# Patient Record
Sex: FEMALE | Race: BLACK OR AFRICAN AMERICAN | NOT HISPANIC OR LATINO | Employment: OTHER | URBAN - METROPOLITAN AREA
[De-identification: names, ages, dates, MRNs, and addresses within clinical notes are randomized per-mention and may not be internally consistent; named-entity substitution may affect disease eponyms.]

---

## 2023-03-24 ENCOUNTER — HOSPITAL ENCOUNTER (OUTPATIENT)
Facility: HOSPITAL | Age: 78
Discharge: HOME HEALTH | End: 2023-03-27
Attending: FAMILY MEDICINE | Admitting: INTERNAL MEDICINE
Payer: MEDICARE

## 2023-03-24 DIAGNOSIS — R55 SYNCOPE: ICD-10-CM

## 2023-03-24 DIAGNOSIS — K52.9 GASTROENTERITIS: ICD-10-CM

## 2023-03-24 DIAGNOSIS — R55 SYNCOPE, UNSPECIFIED SYNCOPE TYPE: Primary | ICD-10-CM

## 2023-03-24 DIAGNOSIS — R11.10 VOMITING: ICD-10-CM

## 2023-03-24 LAB
ALBUMIN SERPL BCP-MCNC: 3.7 G/DL (ref 3.5–5.2)
ALP SERPL-CCNC: 102 U/L (ref 55–135)
ALT SERPL W/O P-5'-P-CCNC: 14 U/L (ref 10–44)
ANION GAP SERPL CALC-SCNC: 16 MMOL/L (ref 8–16)
AST SERPL-CCNC: 19 U/L (ref 10–40)
BACTERIA #/AREA URNS HPF: ABNORMAL /HPF
BASOPHILS # BLD AUTO: 0.04 K/UL (ref 0–0.2)
BASOPHILS NFR BLD: 0.4 % (ref 0–1.9)
BILIRUB SERPL-MCNC: 0.5 MG/DL (ref 0.1–1)
BILIRUB UR QL STRIP: NEGATIVE
BUN SERPL-MCNC: 16 MG/DL (ref 8–23)
CALCIUM SERPL-MCNC: 9.6 MG/DL (ref 8.7–10.5)
CHLORIDE SERPL-SCNC: 105 MMOL/L (ref 95–110)
CLARITY UR: CLEAR
CO2 SERPL-SCNC: 21 MMOL/L (ref 23–29)
COLOR UR: COLORLESS
CREAT SERPL-MCNC: 1.4 MG/DL (ref 0.5–1.4)
DIFFERENTIAL METHOD: ABNORMAL
EOSINOPHIL # BLD AUTO: 0.1 K/UL (ref 0–0.5)
EOSINOPHIL NFR BLD: 0.7 % (ref 0–8)
ERYTHROCYTE [DISTWIDTH] IN BLOOD BY AUTOMATED COUNT: 14.1 % (ref 11.5–14.5)
EST. GFR  (NO RACE VARIABLE): 39 ML/MIN/1.73 M^2
GLUCOSE SERPL-MCNC: 118 MG/DL (ref 70–110)
GLUCOSE UR QL STRIP: NEGATIVE
HCT VFR BLD AUTO: 34.2 % (ref 37–48.5)
HGB BLD-MCNC: 10.7 G/DL (ref 12–16)
HGB UR QL STRIP: NEGATIVE
HYALINE CASTS #/AREA URNS LPF: 7 /LPF
IMM GRANULOCYTES # BLD AUTO: 0.04 K/UL (ref 0–0.04)
IMM GRANULOCYTES NFR BLD AUTO: 0.4 % (ref 0–0.5)
KETONES UR QL STRIP: ABNORMAL
LACTATE SERPL-SCNC: 1.7 MMOL/L (ref 0.5–2.2)
LACTATE SERPL-SCNC: 4.1 MMOL/L (ref 0.5–2.2)
LEUKOCYTE ESTERASE UR QL STRIP: ABNORMAL
LYMPHOCYTES # BLD AUTO: 3.3 K/UL (ref 1–4.8)
LYMPHOCYTES NFR BLD: 34.3 % (ref 18–48)
MCH RBC QN AUTO: 26.1 PG (ref 27–31)
MCHC RBC AUTO-ENTMCNC: 31.3 G/DL (ref 32–36)
MCV RBC AUTO: 83 FL (ref 82–98)
MICROSCOPIC COMMENT: ABNORMAL
MONOCYTES # BLD AUTO: 1.1 K/UL (ref 0.3–1)
MONOCYTES NFR BLD: 11.1 % (ref 4–15)
NEUTROPHILS # BLD AUTO: 5.1 K/UL (ref 1.8–7.7)
NEUTROPHILS NFR BLD: 53.1 % (ref 38–73)
NITRITE UR QL STRIP: NEGATIVE
NRBC BLD-RTO: 0 /100 WBC
PH UR STRIP: 7 [PH] (ref 5–8)
PLATELET # BLD AUTO: 339 K/UL (ref 150–450)
PMV BLD AUTO: 10.3 FL (ref 9.2–12.9)
POTASSIUM SERPL-SCNC: 4.1 MMOL/L (ref 3.5–5.1)
PROT SERPL-MCNC: 7.4 G/DL (ref 6–8.4)
PROT UR QL STRIP: NEGATIVE
RBC # BLD AUTO: 4.1 M/UL (ref 4–5.4)
SODIUM SERPL-SCNC: 142 MMOL/L (ref 136–145)
SP GR UR STRIP: 1.01 (ref 1–1.03)
URN SPEC COLLECT METH UR: ABNORMAL
UROBILINOGEN UR STRIP-ACNC: NEGATIVE EU/DL
WBC # BLD AUTO: 9.59 K/UL (ref 3.9–12.7)
WBC #/AREA URNS HPF: 1 /HPF (ref 0–5)

## 2023-03-24 PROCEDURE — 83605 ASSAY OF LACTIC ACID: CPT | Performed by: FAMILY MEDICINE

## 2023-03-24 PROCEDURE — 96367 TX/PROPH/DG ADDL SEQ IV INF: CPT

## 2023-03-24 PROCEDURE — 93010 EKG 12-LEAD: ICD-10-PCS | Mod: ,,, | Performed by: INTERNAL MEDICINE

## 2023-03-24 PROCEDURE — 93005 ELECTROCARDIOGRAM TRACING: CPT

## 2023-03-24 PROCEDURE — 96375 TX/PRO/DX INJ NEW DRUG ADDON: CPT

## 2023-03-24 PROCEDURE — 96365 THER/PROPH/DIAG IV INF INIT: CPT

## 2023-03-24 PROCEDURE — 93010 ELECTROCARDIOGRAM REPORT: CPT | Mod: ,,, | Performed by: INTERNAL MEDICINE

## 2023-03-24 PROCEDURE — 63600175 PHARM REV CODE 636 W HCPCS: Performed by: FAMILY MEDICINE

## 2023-03-24 PROCEDURE — 87040 BLOOD CULTURE FOR BACTERIA: CPT | Mod: 59 | Performed by: FAMILY MEDICINE

## 2023-03-24 PROCEDURE — 85025 COMPLETE CBC W/AUTO DIFF WBC: CPT | Performed by: FAMILY MEDICINE

## 2023-03-24 PROCEDURE — 80053 COMPREHEN METABOLIC PANEL: CPT | Performed by: FAMILY MEDICINE

## 2023-03-24 PROCEDURE — 25000003 PHARM REV CODE 250: Performed by: FAMILY MEDICINE

## 2023-03-24 PROCEDURE — 96361 HYDRATE IV INFUSION ADD-ON: CPT

## 2023-03-24 PROCEDURE — 99285 EMERGENCY DEPT VISIT HI MDM: CPT | Mod: 25

## 2023-03-24 PROCEDURE — 81000 URINALYSIS NONAUTO W/SCOPE: CPT | Performed by: FAMILY MEDICINE

## 2023-03-24 RX ORDER — MORPHINE SULFATE 4 MG/ML
4 INJECTION, SOLUTION INTRAMUSCULAR; INTRAVENOUS
Status: COMPLETED | OUTPATIENT
Start: 2023-03-24 | End: 2023-03-24

## 2023-03-24 RX ORDER — ONDANSETRON 2 MG/ML
8 INJECTION INTRAMUSCULAR; INTRAVENOUS
Status: COMPLETED | OUTPATIENT
Start: 2023-03-24 | End: 2023-03-24

## 2023-03-24 RX ORDER — SODIUM CHLORIDE 9 MG/ML
1000 INJECTION, SOLUTION INTRAVENOUS
Status: COMPLETED | OUTPATIENT
Start: 2023-03-24 | End: 2023-03-24

## 2023-03-24 RX ADMIN — ONDANSETRON 8 MG: 2 INJECTION INTRAMUSCULAR; INTRAVENOUS at 04:03

## 2023-03-24 RX ADMIN — MORPHINE SULFATE 4 MG: 4 INJECTION INTRAVENOUS at 05:03

## 2023-03-24 RX ADMIN — PROMETHAZINE HYDROCHLORIDE 25 MG: 25 INJECTION INTRAMUSCULAR; INTRAVENOUS at 09:03

## 2023-03-24 RX ADMIN — PIPERACILLIN SODIUM AND TAZOBACTAM SODIUM 4.5 G: 4; .5 INJECTION, POWDER, FOR SOLUTION INTRAVENOUS at 06:03

## 2023-03-24 RX ADMIN — SODIUM CHLORIDE 1000 ML: 9 INJECTION, SOLUTION INTRAVENOUS at 06:03

## 2023-03-24 RX ADMIN — SODIUM CHLORIDE 1000 ML: 9 INJECTION, SOLUTION INTRAVENOUS at 04:03

## 2023-03-24 RX ADMIN — SODIUM CHLORIDE 1000 ML: 9 INJECTION, SOLUTION INTRAVENOUS at 09:03

## 2023-03-24 NOTE — ED PROVIDER NOTES
SCRIBE #1 NOTE: I, Placido Patterson and Nic Toure, am scribing for, and in the presence of, Rachel Piña MD. I have scribed the entire note.       History     Chief Complaint   Patient presents with    Emesis     Vomiting, diarrhea after eating seafood.  Pt had syncopal episode     Review of patient's allergies indicates:   Allergen Reactions    Penicillins          History of Present Illness     HPI    3/24/2023, 5:02 PM  History obtained from the patient and daughter      History of Present Illness: Patti Bragg is a 77 y.o. female patient who presents to the Emergency Department for evaluation of Emesis which onset PTA. Pt was eating fried seafood and started to feel unwell and passed out while shopping. Symptoms are constant and moderate in severity. No mitigating or exacerbating factors reported. Associated sxs include n/d, stomach pain, and weakness. Pt also passwed out after eating and Patient denies any SOB, fever, CP, HA and all other sxs at this time.No prior Tx. No further complaints or concerns at this time.       Arrival mode: EMS     PCP: Janis Lubin MD        Past Medical History:  Past Medical History:   Diagnosis Date    Diabetes mellitus     Hypertension     Mixed hyperlipidemia        Past Surgical History:  History reviewed. No pertinent surgical history.      Family History:  History reviewed. No pertinent family history.    Social History:  Social History     Tobacco Use    Smoking status: Unknown    Smokeless tobacco: Not on file   Substance and Sexual Activity    Alcohol use: Not on file    Drug use: Not on file    Sexual activity: Not on file        Review of Systems     Review of Systems   Constitutional:  Negative for chills and fever.   HENT:  Negative for congestion and sore throat.    Respiratory:  Negative for cough and shortness of breath.    Cardiovascular:  Negative for chest pain.   Gastrointestinal:  Positive for abdominal pain, diarrhea, nausea and vomiting.    Genitourinary:  Negative for dysuria.   Musculoskeletal:  Negative for back pain.   Skin:  Negative for rash.   Neurological:  Positive for weakness. Negative for dizziness and headaches.   Hematological:  Does not bruise/bleed easily.   All other systems reviewed and are negative.     Physical Exam     Initial Vitals   BP Pulse Resp Temp SpO2   03/24/23 1640 03/24/23 1640 03/24/23 1640 03/24/23 1749 03/24/23 1640   126/64 82 18 97.6 °F (36.4 °C) 100 %      MAP       --                 Physical Exam  Nursing Notes and Vital Signs Reviewed.  Constitutional: Patient is in acute distress. Well-developed and well-nourished.  Head: Atraumatic. Normocephalic.  Eyes: PERRL. EOM intact. Conjunctivae are not pale. No scleral icterus.  ENT: Mucous membranes are moist. Oropharynx is clear and symmetric.    Neck: Supple. Full ROM. No lymphadenopathy.  Cardiovascular: Regular rate. Regular rhythm. No murmurs, rubs, or gallops. Distal pulses are 2+ and symmetric.  Pulmonary/Chest: No respiratory distress. Clear to auscultation bilaterally. No wheezing or rales.  Abdominal: Soft and non-distended.  No rebound, guarding, or rigidity. Good bowel sounds. Bilateral LQ tenderness. Vomiting.  Genitourinary: No CVA tenderness  Musculoskeletal: Moves all extremities. No obvious deformities. No edema. No calf tenderness.  Skin: Warm and dry.  Neurological:  Alert, awake, and appropriate.  Normal speech.  No acute focal neurological deficits are appreciated.  Psychiatric: Normal affect. Good eye contact. Appropriate in content.     ED Course   Critical Care    Date/Time: 3/24/2023 5:48 PM  Performed by: Rachel Piña MD  Authorized by: Rachel Piña MD   Direct patient critical care time: 9 minutes  Additional history critical care time: 7 minutes  Ordering / reviewing critical care time: 6 minutes  Documentation critical care time: 7 minutes  Consulting other physicians critical care time: 7 minutes  Total critical care time  "(exclusive of procedural time) : 36 minutes  Critical care time was exclusive of separately billable procedures and treating other patients and teaching time.  Critical care was necessary to treat or prevent imminent or life-threatening deterioration of the following conditions: sepsis (Syncope).  Critical care was time spent personally by me on the following activities: blood draw for specimens, development of treatment plan with patient or surrogate, discussions with consultants, interpretation of cardiac output measurements, evaluation of patient's response to treatment, examination of patient, obtaining history from patient or surrogate, pulse oximetry, ordering and review of laboratory studies, ordering and performing treatments and interventions, re-evaluation of patient's condition and review of old charts.      ED Vital Signs:  Vitals:    03/24/23 2030 03/24/23 2342 03/24/23 2345 03/25/23 0105   BP: 136/76 136/76  128/62   Pulse: (!) 55 69 69 80   Resp: 18 16     Temp:  98 °F (36.7 °C)     TempSrc:  Oral     SpO2: 99% (!) 92% 95%    Weight:       Height:        03/25/23 0110 03/25/23 0114 03/25/23 0115 03/25/23 0330   BP: 130/66 134/71 134/71 133/77   Pulse: 69 (!) 58 62 64   Resp:   17 14   Temp:   98 °F (36.7 °C) 98.3 °F (36.8 °C)   TempSrc:   Oral Oral   SpO2:   97% (!) 94%   Weight:       Height:        03/25/23 0640 03/25/23 0839 03/25/23 1020 03/25/23 1028   BP: (!) 143/70 127/67 123/65    Pulse: 60 (!) 53 (!) 56    Resp: 14 18 18    Temp: 98 °F (36.7 °C) 97.3 °F (36.3 °C) 98.4 °F (36.9 °C)    TempSrc: Oral Oral Oral    SpO2: (!) 94% 98% 97%    Weight:    90.1 kg (198 lb 10.2 oz)   Height:    5' 5.5" (1.664 m)    03/25/23 1215 03/25/23 1444 03/25/23 1557   BP: 135/63  (!) 109/53   Pulse: (!) 50  62   Resp: 18  18   Temp: 98.4 °F (36.9 °C)  98.6 °F (37 °C)   TempSrc: Oral  Oral   SpO2: 98% 99% 95%   Weight: 89.8 kg (198 lb)     Height: 5' 5" (1.651 m)         Abnormal Lab Results:  Labs Reviewed   CBC " W/ AUTO DIFFERENTIAL - Abnormal; Notable for the following components:       Result Value    Hemoglobin 10.7 (*)     Hematocrit 34.2 (*)     MCH 26.1 (*)     MCHC 31.3 (*)     Mono # 1.1 (*)     All other components within normal limits   COMPREHENSIVE METABOLIC PANEL - Abnormal; Notable for the following components:    CO2 21 (*)     Glucose 118 (*)     eGFR 39 (*)     All other components within normal limits   LACTIC ACID, PLASMA - Abnormal; Notable for the following components:    Lactate (Lactic Acid) 4.1 (*)     All other components within normal limits    Narrative:     LA critical result(s) called and verbal readback obtained from SAMM LOO RN by [email or web address] 03/24/2023 17:39   URINALYSIS, REFLEX TO URINE CULTURE - Abnormal; Notable for the following components:    Color, UA Colorless (*)     Ketones, UA Trace (*)     Leukocytes, UA Trace (*)     All other components within normal limits    Narrative:     Specimen Source->Urine   URINALYSIS MICROSCOPIC - Abnormal; Notable for the following components:    Hyaline Casts, UA 7 (*)     All other components within normal limits    Narrative:     Specimen Source->Urine   CULTURE, BLOOD    Narrative:     Aerobic and anaerobic   CULTURE, BLOOD    Narrative:     Aerobic and anaerobic   LACTIC ACID, PLASMA   B-TYPE NATRIURETIC PEPTIDE   TROPONIN I   TROPONIN I   TROPONIN I        All Lab Results:  Results for orders placed or performed during the hospital encounter of 03/24/23   Blood culture x two cultures. Draw prior to antibiotics.    Specimen: Peripheral, Antecubital, Right; Blood   Result Value Ref Range    Blood Culture, Routine No Growth to date    Blood culture x two cultures. Draw prior to antibiotics.    Specimen: Peripheral, Antecubital, Left; Blood   Result Value Ref Range    Blood Culture, Routine No Growth to date    CBC auto differential   Result Value Ref Range    WBC 9.59 3.90 - 12.70 K/uL    RBC 4.10 4.00 - 5.40 M/uL    Hemoglobin 10.7 (L) 12.0 - 16.0  g/dL    Hematocrit 34.2 (L) 37.0 - 48.5 %    MCV 83 82 - 98 fL    MCH 26.1 (L) 27.0 - 31.0 pg    MCHC 31.3 (L) 32.0 - 36.0 g/dL    RDW 14.1 11.5 - 14.5 %    Platelets 339 150 - 450 K/uL    MPV 10.3 9.2 - 12.9 fL    Immature Granulocytes 0.4 0.0 - 0.5 %    Gran # (ANC) 5.1 1.8 - 7.7 K/uL    Immature Grans (Abs) 0.04 0.00 - 0.04 K/uL    Lymph # 3.3 1.0 - 4.8 K/uL    Mono # 1.1 (H) 0.3 - 1.0 K/uL    Eos # 0.1 0.0 - 0.5 K/uL    Baso # 0.04 0.00 - 0.20 K/uL    nRBC 0 0 /100 WBC    Gran % 53.1 38.0 - 73.0 %    Lymph % 34.3 18.0 - 48.0 %    Mono % 11.1 4.0 - 15.0 %    Eosinophil % 0.7 0.0 - 8.0 %    Basophil % 0.4 0.0 - 1.9 %    Differential Method Automated    Comprehensive metabolic panel   Result Value Ref Range    Sodium 142 136 - 145 mmol/L    Potassium 4.1 3.5 - 5.1 mmol/L    Chloride 105 95 - 110 mmol/L    CO2 21 (L) 23 - 29 mmol/L    Glucose 118 (H) 70 - 110 mg/dL    BUN 16 8 - 23 mg/dL    Creatinine 1.4 0.5 - 1.4 mg/dL    Calcium 9.6 8.7 - 10.5 mg/dL    Total Protein 7.4 6.0 - 8.4 g/dL    Albumin 3.7 3.5 - 5.2 g/dL    Total Bilirubin 0.5 0.1 - 1.0 mg/dL    Alkaline Phosphatase 102 55 - 135 U/L    AST 19 10 - 40 U/L    ALT 14 10 - 44 U/L    Anion Gap 16 8 - 16 mmol/L    eGFR 39 (A) >60 mL/min/1.73 m^2   Lactic acid, plasma #1   Result Value Ref Range    Lactate (Lactic Acid) 4.1 (HH) 0.5 - 2.2 mmol/L   Lactic acid, plasma #2   Result Value Ref Range    Lactate (Lactic Acid) 1.7 0.5 - 2.2 mmol/L   Urinalysis, Reflex to Urine Culture Urine, Clean Catch    Specimen: Urine   Result Value Ref Range    Specimen UA Urine, Clean Catch     Color, UA Colorless (A) Yellow, Straw, Shana    Appearance, UA Clear Clear    pH, UA 7.0 5.0 - 8.0    Specific Gravity, UA 1.010 1.005 - 1.030    Protein, UA Negative Negative    Glucose, UA Negative Negative    Ketones, UA Trace (A) Negative    Bilirubin (UA) Negative Negative    Occult Blood UA Negative Negative    Nitrite, UA Negative Negative    Urobilinogen, UA Negative <2.0 EU/dL     Leukocytes, UA Trace (A) Negative   Urinalysis Microscopic   Result Value Ref Range    WBC, UA 1 0 - 5 /hpf    Bacteria Rare None-Occ /hpf    Hyaline Casts, UA 7 (A) 0-1/lpf /lpf    Microscopic Comment SEE COMMENT    Troponin I   Result Value Ref Range    Troponin I <0.006 0.000 - 0.026 ng/mL   Troponin I   Result Value Ref Range    Troponin I <0.006 0.000 - 0.026 ng/mL   Echo   Result Value Ref Range    BSA 2.03 m2    TDI SEPTAL 0.10 m/s    LV LATERAL E/E' RATIO 8.22 m/s    LV SEPTAL E/E' RATIO 7.40 m/s    LA WIDTH 3.50 cm    IVC diameter 1.66 cm    Left Ventricular Outflow Tract Mean Velocity 1.05 cm/s    Left Ventricular Outflow Tract Mean Gradient 4.90 mmHg    TDI LATERAL 0.09 m/s    LVIDd 3.53 3.5 - 6.0 cm    IVS 1.36 (A) 0.6 - 1.1 cm    Posterior Wall 1.39 (A) 0.6 - 1.1 cm    Ao root annulus 3.28 cm    LVIDs 2.27 2.1 - 4.0 cm    FS 36 28 - 44 %    LA volume 56.78 cm3    STJ 3.50 cm    Ascending aorta 3.56 cm    LV mass 170.07 g    LA size 2.99 cm    TAPSE 1.80 cm    Left Ventricle Relative Wall Thickness 0.79 cm    AV mean gradient 12 mmHg    AV valve area 2.30 cm2    AV Velocity Ratio 0.65     AV index (prosthetic) 0.67     MV valve area p 1/2 method 2.28 cm2    E/A ratio 0.67     Mean e' 0.10 m/s    E wave deceleration time 333.30 msec    IVRT 87.54 msec    LVOT diameter 2.09 cm    LVOT area 3.4 cm2    LVOT peak león 1.46 m/s    LVOT peak VTI 31.70 cm    Ao peak león 2.25 m/s    Ao VTI 47.3 cm    RVOT peak león 0.65 m/s    RVOT peak VTI 16.0 cm    LVOT stroke volume 108.70 cm3    AV peak gradient 20 mmHg    PV mean gradient 0.81 mmHg    E/E' ratio 7.79 m/s    MV Peak E León 0.74 m/s    TR Max León 3.51 m/s    MV stenosis pressure 1/2 time 96.66 ms    MV Peak A Le[place] 1.10 m/s    LV Systolic Volume 17.44 mL    LV Systolic Volume Index 8.9 mL/m2    LV Diastolic Volume 51.94 mL    LV Diastolic Volume Index 26.37 mL/m2    LA Volume Index 28.8 mL/m2    LV Mass Index 86 g/m2    RA Major Axis 4.88 cm    Left Atrium Minor  Axis 6.49 cm    Left Atrium Major Axis 6.28 cm    Triscuspid Valve Regurgitation Peak Gradient 49 mmHg    Right Atrial Pressure (from IVC) 3 mmHg    EF 60 %    TV rest pulmonary artery pressure 52 mmHg   POCT glucose   Result Value Ref Range    POCT Glucose 135 (H) 70 - 110 mg/dL        Imaging Results:  Imaging Results              CT Head Without Contrast (Final result)  Result time 03/25/23 07:04:35      Final result by Jenaro Alves MD (03/25/23 07:04:35)                   Impression:      No evidence of acute intracranial pathology.  Consider further evaluation as warranted.  Chronic findings as above.      Electronically signed by: Jenaro Alves  Date:    03/25/2023  Time:    07:04               Narrative:    EXAMINATION:  CT HEAD WITHOUT CONTRAST    CLINICAL HISTORY:  Mental status change, unknown cause;    TECHNIQUE:  Low dose axial CT images obtained throughout the head without the use of intravenous contrast.  Axial, sagittal and coronal reconstructions were performed. All CT scans at this location are performed using dose modulation techniques as appropriate to a performed exam including the following: Automated exposure control; adjustment of the mA and/or kV according to patient size (this includes techniques or standardized protocols for targeted exams where dose is matched to indication/reason for exam; i. e. extremities or head); use of iterative reconstruction technique.    COMPARISON:  None.    FINDINGS:  Intracranial compartment:    Mild generalized cerebral volume loss and periventricular hypoattenuation suggesting mild chronic microvascular ischemic changes.  No parenchymal mass, hemorrhage, edema or major vascular distribution infarct. No evidence of hydrocephalus.    No extra-axial blood or fluid collections.    Skull/extracranial contents (limited evaluation):    No fracture. Mastoid air cells and paranasal sinuses are essentially clear.                                       X-Ray Chest  AP Portable (Final result)  Result time 03/24/23 18:52:27      Final result by Harry Chamorro MD (03/24/23 18:52:27)                   Impression:      No acute abnormality.      Electronically signed by: Harry Chamorro  Date:    03/24/2023  Time:    18:52               Narrative:    EXAMINATION:  XR CHEST AP PORTABLE    CLINICAL HISTORY:  Sepsis;    TECHNIQUE:  Single frontal view of the chest was performed.    COMPARISON:  None    FINDINGS:  The lungs are clear, with normal appearance of pulmonary vasculature and no pleural effusion or pneumothorax.    The cardiac silhouette is normal in size. The hilar and mediastinal contours are unremarkable.    Bones are intact.                                       CT Abdomen Pelvis  Without Contrast (Final result)  Result time 03/24/23 18:47:20      Final result by Miladys Fields MD (03/24/23 18:47:20)                   Impression:      No overt acute abdominopelvic finding      Electronically signed by: Miladys Fields  Date:    03/24/2023  Time:    18:47               Narrative:    EXAMINATION:  CT ABDOMEN PELVIS WITHOUT CONTRAST    CLINICAL HISTORY:  Abdominal pain, acute, nonlocalized;    TECHNIQUE:  Low dose axial images, sagittal and coronal reformations were obtained from the lung bases to the pubic symphysis.  Contrast was not administered.    COMPARISON:  None    FINDINGS:  Unenhanced liver and spleen normal size.  Gallbladder present    Pancreas unremarkable    Kidneys without hydronephrosis.  Adrenal glands minimally enlarged diffusely    Urinary bladder moderately distended    No obstructive bowel findings.  Small hiatal hernia.  Pericecal surgical change    Small fat containing umbilical hernia    Multilevel spondylosis marked with canal stenosis                                       The EKG was ordered, reviewed, and independently interpreted by the ED provider.  Interpretation time: 17:22  Rate: 55 BPM  Rhythm: sinus bradycardia  Interpretation: No  acute ST changes. No STEMI.             The Emergency Provider reviewed the vital signs and test results, which are outlined above.     ED Discussion       9:13 PM: Reassessed pt at this time.  Discussed with pt all pertinent ED information and results. Discussed pt dx and plan of tx. Gave pt all f/u and return to the ED instructions. All questions and concerns were addressed at this time. Pt expresses understanding of information and instructions, and is comfortable with plan to discharge. Pt is stable for discharge.    I discussed with patient and/or family/caretaker that evaluation in the ED does not suggest any emergent or life threatening medical conditions requiring immediate intervention beyond what was provided in the ED, and I believe patient is safe for discharge.  Regardless, an unremarkable evaluation in the ED does not preclude the development or presence of a serious of life threatening condition. As such, patient was instructed to return immediately for any worsening or change in current symptoms.        Medical Decision Making:   Clinical Tests:   Lab Tests: Ordered and Reviewed  Radiological Study: Ordered and Reviewed  Medical Tests: Ordered and Reviewed         ED Medication(s):  Medications   acetaminophen tablet 650 mg (has no administration in time range)   ondansetron injection 4 mg (has no administration in time range)   aluminum-magnesium hydroxide-simethicone 200-200-20 mg/5 mL suspension 30 mL (has no administration in time range)   guaiFENesin 100 mg/5 ml syrup 200 mg (has no administration in time range)   0.9%  NaCl infusion ( Intravenous New Bag 3/25/23 1059)   ondansetron injection 8 mg (8 mg Intravenous Given 3/24/23 1648)   0.9%  NaCl infusion (0 mLs Intravenous Stopped 3/24/23 1845)   morphine injection 4 mg (4 mg Intravenous Given 3/24/23 1729)   piperacillin-tazobactam (ZOSYN) 4.5 g in dextrose 5 % in water (D5W) 5 % 100 mL IVPB (MB+) (0 g Intravenous Stopped 3/24/23 1929)    sodium chloride 0.9% bolus 1,000 mL 1,000 mL (0 mLs Intravenous Stopped 3/24/23 1958)   sodium chloride 0.9% bolus 1,000 mL 1,000 mL (0 mLs Intravenous Stopped 3/24/23 2257)   promethazine (PHENERGAN) 25 mg in dextrose 5 % (D5W) 50 mL IVPB (0 mg Intravenous Stopped 3/24/23 2217)       Current Discharge Medication List                  Scribe Attestation:   Scribe #1: I performed the above scribed service and the documentation accurately describes the services I performed. I attest to the accuracy of the note.     Attending:   Physician Attestation Statement for Scribe #1: I, Rachel Piña MD, personally performed the services described in this documentation, as scribed by Placido Patterson and Nic Toure, in my presence, and it is both accurate and complete.           Clinical Impression       ICD-10-CM ICD-9-CM   1. Syncope, unspecified syncope type  R55 780.2   2. Vomiting  R11.10 787.03   3. Gastroenteritis  K52.9 558.9   4. Syncope  R55 780.2       Disposition:   Disposition: Discharged  Condition: Stable      Rachel Piña MD  03/25/23 2294

## 2023-03-25 PROBLEM — R55 SYNCOPE: Status: ACTIVE | Noted: 2023-03-25

## 2023-03-25 PROBLEM — R00.1 SINUS BRADYCARDIA: Status: ACTIVE | Noted: 2023-03-25

## 2023-03-25 PROBLEM — K52.9 GASTROENTERITIS: Status: ACTIVE | Noted: 2023-03-25

## 2023-03-25 PROBLEM — E87.20 LACTIC ACIDOSIS: Status: ACTIVE | Noted: 2023-03-25

## 2023-03-25 LAB
AORTIC ROOT ANNULUS: 3.28 CM
ASCENDING AORTA: 3.56 CM
AV INDEX (PROSTH): 0.67
AV MEAN GRADIENT: 12 MMHG
AV PEAK GRADIENT: 20 MMHG
AV VALVE AREA: 2.3 CM2
AV VELOCITY RATIO: 0.65
BSA FOR ECHO PROCEDURE: 2.03 M2
CV ECHO LV RWT: 0.79 CM
DOP CALC AO PEAK VEL: 2.25 M/S
DOP CALC AO VTI: 47.3 CM
DOP CALC LVOT AREA: 3.4 CM2
DOP CALC LVOT DIAMETER: 2.09 CM
DOP CALC LVOT PEAK VEL: 1.46 M/S
DOP CALC LVOT STROKE VOLUME: 108.7 CM3
DOP CALC RVOT PEAK VEL: 0.65 M/S
DOP CALC RVOT VTI: 16 CM
DOP CALCLVOT PEAK VEL VTI: 31.7 CM
E WAVE DECELERATION TIME: 333.3 MSEC
E/A RATIO: 0.67
E/E' RATIO: 7.79 M/S
ECHO LV POSTERIOR WALL: 1.39 CM (ref 0.6–1.1)
EJECTION FRACTION: 60 %
FRACTIONAL SHORTENING: 36 % (ref 28–44)
INTERVENTRICULAR SEPTUM: 1.36 CM (ref 0.6–1.1)
IVC DIAMETER: 1.66 CM
IVRT: 87.54 MSEC
LA MAJOR: 6.28 CM
LA MINOR: 6.49 CM
LA WIDTH: 3.5 CM
LEFT ATRIUM SIZE: 2.99 CM
LEFT ATRIUM VOLUME INDEX: 28.8 ML/M2
LEFT ATRIUM VOLUME: 56.78 CM3
LEFT INTERNAL DIMENSION IN SYSTOLE: 2.27 CM (ref 2.1–4)
LEFT VENTRICLE DIASTOLIC VOLUME INDEX: 26.37 ML/M2
LEFT VENTRICLE DIASTOLIC VOLUME: 51.94 ML
LEFT VENTRICLE MASS INDEX: 86 G/M2
LEFT VENTRICLE SYSTOLIC VOLUME INDEX: 8.9 ML/M2
LEFT VENTRICLE SYSTOLIC VOLUME: 17.44 ML
LEFT VENTRICULAR INTERNAL DIMENSION IN DIASTOLE: 3.53 CM (ref 3.5–6)
LEFT VENTRICULAR MASS: 170.07 G
LV LATERAL E/E' RATIO: 8.22 M/S
LV SEPTAL E/E' RATIO: 7.4 M/S
LVOT MG: 4.9 MMHG
LVOT MV: 1.05 CM/S
MV PEAK A VEL: 1.1 M/S
MV PEAK E VEL: 0.74 M/S
MV STENOSIS PRESSURE HALF TIME: 96.66 MS
MV VALVE AREA P 1/2 METHOD: 2.28 CM2
PISA TR MAX VEL: 3.51 M/S
POCT GLUCOSE: 135 MG/DL (ref 70–110)
PV MEAN GRADIENT: 0.81 MMHG
RA MAJOR: 4.88 CM
RA PRESSURE: 3 MMHG
STJ: 3.5 CM
TDI LATERAL: 0.09 M/S
TDI SEPTAL: 0.1 M/S
TDI: 0.1 M/S
TR MAX PG: 49 MMHG
TRICUSPID ANNULAR PLANE SYSTOLIC EXCURSION: 1.8 CM
TROPONIN I SERPL DL<=0.01 NG/ML-MCNC: <0.006 NG/ML (ref 0–0.03)
TROPONIN I SERPL DL<=0.01 NG/ML-MCNC: <0.006 NG/ML (ref 0–0.03)
TV REST PULMONARY ARTERY PRESSURE: 52 MMHG

## 2023-03-25 PROCEDURE — 25000003 PHARM REV CODE 250: Performed by: INTERNAL MEDICINE

## 2023-03-25 PROCEDURE — G0378 HOSPITAL OBSERVATION PER HR: HCPCS

## 2023-03-25 PROCEDURE — 96361 HYDRATE IV INFUSION ADD-ON: CPT

## 2023-03-25 PROCEDURE — 36415 COLL VENOUS BLD VENIPUNCTURE: CPT | Performed by: INTERNAL MEDICINE

## 2023-03-25 PROCEDURE — 84484 ASSAY OF TROPONIN QUANT: CPT | Mod: 91 | Performed by: INTERNAL MEDICINE

## 2023-03-25 RX ORDER — AZELASTINE HYDROCHLORIDE 0.5 MG/ML
1 SOLUTION/ DROPS OPHTHALMIC 2 TIMES DAILY
COMMUNITY

## 2023-03-25 RX ORDER — ONDANSETRON 2 MG/ML
4 INJECTION INTRAMUSCULAR; INTRAVENOUS EVERY 8 HOURS PRN
Status: DISCONTINUED | OUTPATIENT
Start: 2023-03-25 | End: 2023-03-27 | Stop reason: HOSPADM

## 2023-03-25 RX ORDER — MAG HYDROX/ALUMINUM HYD/SIMETH 200-200-20
30 SUSPENSION, ORAL (FINAL DOSE FORM) ORAL EVERY 6 HOURS PRN
Status: DISCONTINUED | OUTPATIENT
Start: 2023-03-25 | End: 2023-03-27 | Stop reason: HOSPADM

## 2023-03-25 RX ORDER — DULOXETIN HYDROCHLORIDE 30 MG/1
30 CAPSULE, DELAYED RELEASE ORAL DAILY
COMMUNITY

## 2023-03-25 RX ORDER — SODIUM CHLORIDE 9 MG/ML
INJECTION, SOLUTION INTRAVENOUS CONTINUOUS
Status: DISCONTINUED | OUTPATIENT
Start: 2023-03-25 | End: 2023-03-25

## 2023-03-25 RX ORDER — ASCORBIC ACID 500 MG
500 TABLET ORAL DAILY
COMMUNITY

## 2023-03-25 RX ORDER — ASPIRIN 81 MG/1
81 TABLET ORAL DAILY
COMMUNITY

## 2023-03-25 RX ORDER — ACETAMINOPHEN 325 MG/1
650 TABLET ORAL EVERY 6 HOURS PRN
Status: DISCONTINUED | OUTPATIENT
Start: 2023-03-25 | End: 2023-03-27 | Stop reason: HOSPADM

## 2023-03-25 RX ORDER — ATORVASTATIN CALCIUM 40 MG/1
40 TABLET, FILM COATED ORAL DAILY
COMMUNITY

## 2023-03-25 RX ORDER — GUAIFENESIN 100 MG/5ML
200 SOLUTION ORAL EVERY 4 HOURS PRN
Status: DISCONTINUED | OUTPATIENT
Start: 2023-03-25 | End: 2023-03-27 | Stop reason: HOSPADM

## 2023-03-25 RX ORDER — METFORMIN HYDROCHLORIDE 1000 MG/1
1000 TABLET ORAL
COMMUNITY

## 2023-03-25 RX ORDER — CHOLECALCIFEROL (VITAMIN D3) 25 MCG
2000 TABLET ORAL DAILY
COMMUNITY

## 2023-03-25 RX ORDER — GABAPENTIN 400 MG/1
400 CAPSULE ORAL 3 TIMES DAILY
COMMUNITY

## 2023-03-25 RX ORDER — SODIUM CHLORIDE 9 MG/ML
INJECTION, SOLUTION INTRAVENOUS CONTINUOUS
Status: ACTIVE | OUTPATIENT
Start: 2023-03-25 | End: 2023-03-26

## 2023-03-25 RX ORDER — MELOXICAM 7.5 MG/1
7.5 TABLET ORAL DAILY
COMMUNITY

## 2023-03-25 RX ADMIN — SODIUM CHLORIDE: 9 INJECTION, SOLUTION INTRAVENOUS at 10:03

## 2023-03-25 RX ADMIN — SODIUM CHLORIDE: 9 INJECTION, SOLUTION INTRAVENOUS at 09:03

## 2023-03-25 RX ADMIN — SODIUM CHLORIDE: 9 INJECTION, SOLUTION INTRAVENOUS at 02:03

## 2023-03-25 NOTE — ASSESSMENT & PLAN NOTE
-initial lactic acid 4.1, has improved to 1.7 with IV fluids.    -no evidence of leukocytosis.  No fever, chills.

## 2023-03-25 NOTE — ASSESSMENT & PLAN NOTE
-likely secondary to gastroenteritis, nausea, vomiting.    -orthostatics within normal limits.    -CT head negative for acute intracranial pathology.    -EKG sinus bradycardia.    -monitor on telemetry.    -continue IV fluids.

## 2023-03-25 NOTE — PLAN OF CARE
Patient is awake, alert and oriented x 4. Vitally the patient is stable and denies any concerns regarding treatment. Will continue to monitor the patient and administer scheduled medications.  Tele #[id number].

## 2023-03-25 NOTE — H&P
[facility] - Emergency Dept.  [facility]  History & Physical    Patient Name: Patti Bragg  MRN: [id number]  Patient Class: OP- Observation  Admission Date: 3/24/2023  Attending Physician: Shiraz Hayward MD   Primary Care Provider: Janis Lubin MD         Patient information was obtained from patient, relative(s), past medical records and ER records.     Subjective:     Principal Problem:Syncope    Chief Complaint:   Chief Complaint   Patient presents with    Emesis     Vomiting, diarrhea after eating seafood.  Pt had syncopal episode        HPI: Ms. Bragg is a 77-year-old  female with PMH significant for HTN, insulin-dependent diabetes, hyperlipidemia, presented to the ED after sustaining a syncopal episode.  Patient had a dinner at a restaurant, and shortly thereafter she had one episode of loose watery bowel movements, and while shopping at [employer], she felt lightheaded, dizzy and passed out.  Did not hit her head.  She has been complaining of nausea, abdominal discomfort.  On presentation to the ED, initial lactic acid 4.1, received 2 L NS boluses, improved to 1.7.  CT abdomen is unremarkable.  Hemodynamically stable, BP systolic 120s to 140s.  HR 50s to 70s.  WBC 9.5.  Rest of the laboratory workup is unremarkable.  CT head negative for acute intracranial pathology.  Orthostatic vital signs within normal limits.  However patient could be discharged from the ED due to dizziness upon standing and difficulty ambulating out of the room.    Place in observation for syncope.  Likely secondary to gastroenteritis.      No past medical history on file.    No past surgical history on file.    Review of patient's allergies indicates:   Allergen Reactions    Penicillins        No current facility-administered medications on file prior to encounter.     No current outpatient medications on file prior to encounter.     Family History    Reviewed and Not Pertinent       Tobacco Use    Smoking status:  Not on file    Smokeless tobacco: Not on file   Substance and Sexual Activity    Alcohol use: Not on file    Drug use: Not on file    Sexual activity: Not on file     Review of Systems   Constitutional: Negative.  Negative for chills and fever.   HENT: Negative.  Negative for congestion, rhinorrhea, sore throat and trouble swallowing.    Eyes: Negative.  Negative for visual disturbance.   Respiratory: Negative.  Negative for cough, shortness of breath and wheezing.    Cardiovascular: Negative.  Negative for chest pain and palpitations.   Gastrointestinal:  Positive for abdominal pain, diarrhea (improved), nausea and vomiting.   Endocrine: Negative.    Genitourinary: Negative.  Negative for dysuria and flank pain.   Musculoskeletal: Negative.  Negative for back pain.   Skin: Negative.  Negative for rash.   Allergic/Immunologic: Negative.    Neurological:  Positive for dizziness, syncope, weakness (generalized) and light-headedness. Negative for speech difficulty, numbness and headaches.   Hematological: Negative.    Psychiatric/Behavioral: Negative.  Negative for hallucinations. The patient is not nervous/anxious.    All other systems reviewed and are negative.  Objective:     Vital Signs (Most Recent):  Temp: 98 °F (36.7 °C) (03/24/23 2342)  Pulse: (!) 58 (03/25/23 0114)  Resp: 16 (03/24/23 2342)  BP: 134/71 (03/25/23 0114)  SpO2: 95 % (03/24/23 2345)   Vital Signs (24h Range):  Temp:  [97.6 °F (36.4 °C)-98 °F (36.7 °C)] 98 °F (36.7 °C)  Pulse:  [51-82] 58  Resp:  [14-20] 16  SpO2:  [92 %-100 %] 95 %  BP: (124-146)/(61-77) 134/71     Weight: 92.7 kg (204 lb 5.9 oz)  There is no height or weight on file to calculate BMI.    Physical Exam  Vitals and nursing note reviewed.   Constitutional:       General: She is awake. She is not in acute distress.     Appearance: She is not ill-appearing.   HENT:      Head: Normocephalic and atraumatic.      Mouth/Throat:      Mouth: Mucous membranes are moist.   Eyes:       General: No scleral icterus.     Conjunctiva/sclera: Conjunctivae normal.   Cardiovascular:      Rate and Rhythm: Normal rate and regular rhythm.      Heart sounds: Murmur heard.   Pulmonary:      Effort: Pulmonary effort is normal. No respiratory distress.      Breath sounds: Normal breath sounds. No wheezing.   Abdominal:      Palpations: Abdomen is soft.      Tenderness: There is no abdominal tenderness.   Musculoskeletal:         General: No swelling. Normal range of motion.      Cervical back: Normal range of motion and neck supple.   Skin:     General: Skin is warm.      Coloration: Skin is not jaundiced.   Neurological:      General: No focal deficit present.      Mental Status: She is alert and oriented to person, place, and time. Mental status is at baseline.   Psychiatric:         Attention and Perception: Attention normal.         Mood and Affect: Mood normal.         Speech: Speech normal.         Behavior: Behavior normal. Behavior is cooperative.           Significant Labs: All pertinent labs within the past 24 hours have been reviewed.  BMP:   Recent Labs   Lab 03/24/23  1720   *      K 4.1      CO2 21*   BUN 16   CREATININE 1.4   CALCIUM 9.6     CBC:   Recent Labs   Lab 03/24/23  1720   WBC 9.59   HGB 10.7*   HCT 34.2*        CMP:   Recent Labs   Lab 03/24/23  1720      K 4.1      CO2 21*   *   BUN 16   CREATININE 1.4   CALCIUM 9.6   PROT 7.4   ALBUMIN 3.7   BILITOT 0.5   ALKPHOS 102   AST 19   ALT 14   ANIONGAP 16     Lactic Acid:   Recent Labs   Lab 03/24/23  1720 03/24/23  2044   LACTATE 4.1* 1.7       Significant Imaging: I have reviewed all pertinent imaging results/findings within the past 24 hours.  I have reviewed and interpreted all pertinent imaging results/findings within the past 24 hours.    Imaging Results              X-Ray Chest AP Portable (Final result)  Result time 03/24/23 18:52:27      Final result by Harry Chamorro MD (03/24/23  18:52:27)                   Impression:      No acute abnormality.      Electronically signed by: Harry Chamorro  Date:    03/24/2023  Time:    18:52               Narrative:    EXAMINATION:  XR CHEST AP PORTABLE    CLINICAL HISTORY:  Sepsis;    TECHNIQUE:  Single frontal view of the chest was performed.    COMPARISON:  None    FINDINGS:  The lungs are clear, with normal appearance of pulmonary vasculature and no pleural effusion or pneumothorax.    The cardiac silhouette is normal in size. The hilar and mediastinal contours are unremarkable.    Bones are intact.                                       CT Abdomen Pelvis  Without Contrast (Final result)  Result time 03/24/23 18:47:20      Final result by Miladys Fields MD (03/24/23 18:47:20)                   Impression:      No overt acute abdominopelvic finding      Electronically signed by: Miladys Fields  Date:    03/24/2023  Time:    18:47               Narrative:    EXAMINATION:  CT ABDOMEN PELVIS WITHOUT CONTRAST    CLINICAL HISTORY:  Abdominal pain, acute, nonlocalized;    TECHNIQUE:  Low dose axial images, sagittal and coronal reformations were obtained from the lung bases to the pubic symphysis.  Contrast was not administered.    COMPARISON:  None    FINDINGS:  Unenhanced liver and spleen normal size.  Gallbladder present    Pancreas unremarkable    Kidneys without hydronephrosis.  Adrenal glands minimally enlarged diffusely    Urinary bladder moderately distended    No obstructive bowel findings.  Small hiatal hernia.  Pericecal surgical change    Small fat containing umbilical hernia    Multilevel spondylosis marked with canal stenosis                                    I have independently reviewed and interpreted the EKG.     I have independently reviewed all pertinent labs within the past 24 hours.    I have independently reviewed, visualized and interpreted all pertinent imaging results within the past 24 hours and discussed the findings with the  ED physician, Dr. Piña          Assessment/Plan:     * Syncope  -likely secondary to gastroenteritis, nausea, vomiting.    -orthostatics within normal limits.    -CT head negative for acute intracranial pathology.    -EKG sinus bradycardia.    -monitor on telemetry.    -continue IV fluids.      Gastroenteritis  -likely secondary to food poisoning  -IV fluids.    -supportive care.      Lactic acidosis  -initial lactic acid 4.1, has improved to 1.7 with IV fluids.    -no evidence of leukocytosis.  No fever, chills.      Sinus bradycardia  -HR in the 50s.    -not on beta-blockers.    -monitor on telemetry        VTE Risk Mitigation (From admission, onward)         Ordered     Place sequential compression device  Until discontinued         03/25/23 0128                   On 03/25/2023, patient should be placed in hospital observation services under my care.        Shiraz Hayward MD  Department of Hospital Medicine  [facility] - Emergency Dept.

## 2023-03-25 NOTE — HPI
Ms. Bragg is a 77-year-old  female with PMH significant for HTN, insulin-dependent diabetes, hyperlipidemia, presented to the ED after sustaining a syncopal episode.  Patient had a dinner at a restaurant, and shortly thereafter she had one episode of loose watery bowel movements, and while shopping at [employer], she felt lightheaded, dizzy and passed out.  Did not hit her head.  She has been complaining of nausea, abdominal discomfort.  On presentation to the ED, initial lactic acid 4.1, received 2 L NS boluses, improved to 1.7.  CT abdomen is unremarkable.  Hemodynamically stable, BP systolic 120s to 140s.  HR 50s to 70s.  WBC 9.5.  Rest of the laboratory workup is unremarkable.  CT head negative for acute intracranial pathology.  Orthostatic vital signs within normal limits.  However patient could be discharged from the ED due to dizziness upon standing and difficulty ambulating out of the room.    Place in observation for syncope.  Likely secondary to gastroenteritis.

## 2023-03-25 NOTE — CARE UPDATE
Patient seen. Echo ordered due to bradycardia and syncope. Continue current plan of care. Advanced diet. Monitor tolerance

## 2023-03-25 NOTE — SUBJECTIVE & OBJECTIVE
No past medical history on file.    No past surgical history on file.    Review of patient's allergies indicates:   Allergen Reactions    Penicillins        No current facility-administered medications on file prior to encounter.     No current outpatient medications on file prior to encounter.     Family History    Reviewed and Not Pertinent       Tobacco Use    Smoking status: Not on file    Smokeless tobacco: Not on file   Substance and Sexual Activity    Alcohol use: Not on file    Drug use: Not on file    Sexual activity: Not on file     Review of Systems   Constitutional: Negative.  Negative for chills and fever.   HENT: Negative.  Negative for congestion, rhinorrhea, sore throat and trouble swallowing.    Eyes: Negative.  Negative for visual disturbance.   Respiratory: Negative.  Negative for cough, shortness of breath and wheezing.    Cardiovascular: Negative.  Negative for chest pain and palpitations.   Gastrointestinal:  Positive for abdominal pain, diarrhea (improved), nausea and vomiting.   Endocrine: Negative.    Genitourinary: Negative.  Negative for dysuria and flank pain.   Musculoskeletal: Negative.  Negative for back pain.   Skin: Negative.  Negative for rash.   Allergic/Immunologic: Negative.    Neurological:  Positive for dizziness, syncope, weakness (generalized) and light-headedness. Negative for speech difficulty, numbness and headaches.   Hematological: Negative.    Psychiatric/Behavioral: Negative.  Negative for hallucinations. The patient is not nervous/anxious.    All other systems reviewed and are negative.  Objective:     Vital Signs (Most Recent):  Temp: 98 °F (36.7 °C) (03/24/23 2342)  Pulse: (!) 58 (03/25/23 0114)  Resp: 16 (03/24/23 2342)  BP: 134/71 (03/25/23 0114)  SpO2: 95 % (03/24/23 2345)   Vital Signs (24h Range):  Temp:  [97.6 °F (36.4 °C)-98 °F (36.7 °C)] 98 °F (36.7 °C)  Pulse:  [51-82] 58  Resp:  [14-20] 16  SpO2:  [92 %-100 %] 95 %  BP: (124-146)/(61-77) 134/71     Weight:  92.7 kg (204 lb 5.9 oz)  There is no height or weight on file to calculate BMI.    Physical Exam  Vitals and nursing note reviewed.   Constitutional:       General: She is awake. She is not in acute distress.     Appearance: She is not ill-appearing.   HENT:      Head: Normocephalic and atraumatic.      Mouth/Throat:      Mouth: Mucous membranes are moist.   Eyes:      General: No scleral icterus.     Conjunctiva/sclera: Conjunctivae normal.   Cardiovascular:      Rate and Rhythm: Normal rate and regular rhythm.      Heart sounds: Murmur heard.   Pulmonary:      Effort: Pulmonary effort is normal. No respiratory distress.      Breath sounds: Normal breath sounds. No wheezing.   Abdominal:      Palpations: Abdomen is soft.      Tenderness: There is no abdominal tenderness.   Musculoskeletal:         General: No swelling. Normal range of motion.      Cervical back: Normal range of motion and neck supple.   Skin:     General: Skin is warm.      Coloration: Skin is not jaundiced.   Neurological:      General: No focal deficit present.      Mental Status: She is alert and oriented to person, place, and time. Mental status is at baseline.   Psychiatric:         Attention and Perception: Attention normal.         Mood and Affect: Mood normal.         Speech: Speech normal.         Behavior: Behavior normal. Behavior is cooperative.           Significant Labs: All pertinent labs within the past 24 hours have been reviewed.  BMP:   Recent Labs   Lab 03/24/23  1720   *      K 4.1      CO2 21*   BUN 16   CREATININE 1.4   CALCIUM 9.6     CBC:   Recent Labs   Lab 03/24/23  1720   WBC 9.59   HGB 10.7*   HCT 34.2*        CMP:   Recent Labs   Lab 03/24/23  1720      K 4.1      CO2 21*   *   BUN 16   CREATININE 1.4   CALCIUM 9.6   PROT 7.4   ALBUMIN 3.7   BILITOT 0.5   ALKPHOS 102   AST 19   ALT 14   ANIONGAP 16     Lactic Acid:   Recent Labs   Lab 03/24/23  1720 03/24/23  2044   LACTATE  4.1* 1.7       Significant Imaging: I have reviewed all pertinent imaging results/findings within the past 24 hours.  I have reviewed and interpreted all pertinent imaging results/findings within the past 24 hours.    Imaging Results              X-Ray Chest AP Portable (Final result)  Result time 03/24/23 18:52:27      Final result by Harry Chamorro MD (03/24/23 18:52:27)                   Impression:      No acute abnormality.      Electronically signed by: Harry Chamorro  Date:    03/24/2023  Time:    18:52               Narrative:    EXAMINATION:  XR CHEST AP PORTABLE    CLINICAL HISTORY:  Sepsis;    TECHNIQUE:  Single frontal view of the chest was performed.    COMPARISON:  None    FINDINGS:  The lungs are clear, with normal appearance of pulmonary vasculature and no pleural effusion or pneumothorax.    The cardiac silhouette is normal in size. The hilar and mediastinal contours are unremarkable.    Bones are intact.                                       CT Abdomen Pelvis  Without Contrast (Final result)  Result time 03/24/23 18:47:20      Final result by Miladys Fields MD (03/24/23 18:47:20)                   Impression:      No overt acute abdominopelvic finding      Electronically signed by: Miladys Fields  Date:    03/24/2023  Time:    18:47               Narrative:    EXAMINATION:  CT ABDOMEN PELVIS WITHOUT CONTRAST    CLINICAL HISTORY:  Abdominal pain, acute, nonlocalized;    TECHNIQUE:  Low dose axial images, sagittal and coronal reformations were obtained from the lung bases to the pubic symphysis.  Contrast was not administered.    COMPARISON:  None    FINDINGS:  Unenhanced liver and spleen normal size.  Gallbladder present    Pancreas unremarkable    Kidneys without hydronephrosis.  Adrenal glands minimally enlarged diffusely    Urinary bladder moderately distended    No obstructive bowel findings.  Small hiatal hernia.  Pericecal surgical change    Small fat containing umbilical  hernia    Multilevel spondylosis marked with canal stenosis                                    I have independently reviewed and interpreted the EKG.     I have independently reviewed all pertinent labs within the past 24 hours.    I have independently reviewed, visualized and interpreted all pertinent imaging results within the past 24 hours and discussed the findings with the ED physician, Dr. Piña

## 2023-03-26 LAB
ALBUMIN SERPL BCP-MCNC: 3 G/DL (ref 3.5–5.2)
ALP SERPL-CCNC: 89 U/L (ref 55–135)
ALT SERPL W/O P-5'-P-CCNC: 11 U/L (ref 10–44)
ANION GAP SERPL CALC-SCNC: 9 MMOL/L (ref 8–16)
AST SERPL-CCNC: 14 U/L (ref 10–40)
BASOPHILS # BLD AUTO: 0.03 K/UL (ref 0–0.2)
BASOPHILS NFR BLD: 0.4 % (ref 0–1.9)
BILIRUB SERPL-MCNC: 0.3 MG/DL (ref 0.1–1)
BUN SERPL-MCNC: 13 MG/DL (ref 8–23)
CALCIUM SERPL-MCNC: 8.4 MG/DL (ref 8.7–10.5)
CHLORIDE SERPL-SCNC: 108 MMOL/L (ref 95–110)
CO2 SERPL-SCNC: 25 MMOL/L (ref 23–29)
CREAT SERPL-MCNC: 0.8 MG/DL (ref 0.5–1.4)
DIFFERENTIAL METHOD: ABNORMAL
EOSINOPHIL # BLD AUTO: 0.2 K/UL (ref 0–0.5)
EOSINOPHIL NFR BLD: 2.2 % (ref 0–8)
ERYTHROCYTE [DISTWIDTH] IN BLOOD BY AUTOMATED COUNT: 14.1 % (ref 11.5–14.5)
EST. GFR  (NO RACE VARIABLE): >60 ML/MIN/1.73 M^2
GLUCOSE SERPL-MCNC: 140 MG/DL (ref 70–110)
HCT VFR BLD AUTO: 32.7 % (ref 37–48.5)
HGB BLD-MCNC: 10.1 G/DL (ref 12–16)
IMM GRANULOCYTES # BLD AUTO: 0.02 K/UL (ref 0–0.04)
IMM GRANULOCYTES NFR BLD AUTO: 0.3 % (ref 0–0.5)
LYMPHOCYTES # BLD AUTO: 2.2 K/UL (ref 1–4.8)
LYMPHOCYTES NFR BLD: 32.9 % (ref 18–48)
MAGNESIUM SERPL-MCNC: 1.4 MG/DL (ref 1.6–2.6)
MCH RBC QN AUTO: 26 PG (ref 27–31)
MCHC RBC AUTO-ENTMCNC: 30.9 G/DL (ref 32–36)
MCV RBC AUTO: 84 FL (ref 82–98)
MONOCYTES # BLD AUTO: 0.7 K/UL (ref 0.3–1)
MONOCYTES NFR BLD: 11.1 % (ref 4–15)
NEUTROPHILS # BLD AUTO: 3.6 K/UL (ref 1.8–7.7)
NEUTROPHILS NFR BLD: 53.1 % (ref 38–73)
NRBC BLD-RTO: 0 /100 WBC
PLATELET # BLD AUTO: 320 K/UL (ref 150–450)
PMV BLD AUTO: 9.9 FL (ref 9.2–12.9)
POCT GLUCOSE: 140 MG/DL (ref 70–110)
POCT GLUCOSE: 142 MG/DL (ref 70–110)
POCT GLUCOSE: 194 MG/DL (ref 70–110)
POTASSIUM SERPL-SCNC: 3.5 MMOL/L (ref 3.5–5.1)
PROT SERPL-MCNC: 6.2 G/DL (ref 6–8.4)
RBC # BLD AUTO: 3.89 M/UL (ref 4–5.4)
SODIUM SERPL-SCNC: 142 MMOL/L (ref 136–145)
WBC # BLD AUTO: 6.69 K/UL (ref 3.9–12.7)

## 2023-03-26 PROCEDURE — G0378 HOSPITAL OBSERVATION PER HR: HCPCS

## 2023-03-26 PROCEDURE — 63600175 PHARM REV CODE 636 W HCPCS: Performed by: INTERNAL MEDICINE

## 2023-03-26 PROCEDURE — 83735 ASSAY OF MAGNESIUM: CPT | Performed by: INTERNAL MEDICINE

## 2023-03-26 PROCEDURE — 96375 TX/PRO/DX INJ NEW DRUG ADDON: CPT

## 2023-03-26 PROCEDURE — 25000003 PHARM REV CODE 250: Performed by: STUDENT IN AN ORGANIZED HEALTH CARE EDUCATION/TRAINING PROGRAM

## 2023-03-26 PROCEDURE — 80053 COMPREHEN METABOLIC PANEL: CPT | Performed by: INTERNAL MEDICINE

## 2023-03-26 PROCEDURE — 85025 COMPLETE CBC W/AUTO DIFF WBC: CPT | Performed by: INTERNAL MEDICINE

## 2023-03-26 PROCEDURE — 96361 HYDRATE IV INFUSION ADD-ON: CPT

## 2023-03-26 PROCEDURE — 96376 TX/PRO/DX INJ SAME DRUG ADON: CPT

## 2023-03-26 PROCEDURE — 97166 OT EVAL MOD COMPLEX 45 MIN: CPT

## 2023-03-26 PROCEDURE — 96372 THER/PROPH/DIAG INJ SC/IM: CPT | Performed by: STUDENT IN AN ORGANIZED HEALTH CARE EDUCATION/TRAINING PROGRAM

## 2023-03-26 PROCEDURE — 36415 COLL VENOUS BLD VENIPUNCTURE: CPT | Performed by: INTERNAL MEDICINE

## 2023-03-26 RX ORDER — CHOLECALCIFEROL (VITAMIN D3) 25 MCG
2000 TABLET ORAL DAILY
Status: DISCONTINUED | OUTPATIENT
Start: 2023-03-26 | End: 2023-03-27 | Stop reason: HOSPADM

## 2023-03-26 RX ORDER — INSULIN ASPART 100 [IU]/ML
0-5 INJECTION, SOLUTION INTRAVENOUS; SUBCUTANEOUS
Status: DISCONTINUED | OUTPATIENT
Start: 2023-03-26 | End: 2023-03-27 | Stop reason: HOSPADM

## 2023-03-26 RX ORDER — DEXTROSE 40 %
15 GEL (GRAM) ORAL
Status: DISCONTINUED | OUTPATIENT
Start: 2023-03-26 | End: 2023-03-27 | Stop reason: HOSPADM

## 2023-03-26 RX ORDER — SODIUM CHLORIDE 9 MG/ML
INJECTION, SOLUTION INTRAVENOUS CONTINUOUS
Status: ACTIVE | OUTPATIENT
Start: 2023-03-26 | End: 2023-03-27

## 2023-03-26 RX ORDER — GABAPENTIN 400 MG/1
400 CAPSULE ORAL 3 TIMES DAILY PRN
Status: DISCONTINUED | OUTPATIENT
Start: 2023-03-26 | End: 2023-03-27 | Stop reason: HOSPADM

## 2023-03-26 RX ORDER — GLUCAGON 1 MG
1 KIT INJECTION
Status: DISCONTINUED | OUTPATIENT
Start: 2023-03-26 | End: 2023-03-27 | Stop reason: HOSPADM

## 2023-03-26 RX ORDER — DULOXETIN HYDROCHLORIDE 30 MG/1
30 CAPSULE, DELAYED RELEASE ORAL DAILY
Status: DISCONTINUED | OUTPATIENT
Start: 2023-03-26 | End: 2023-03-27 | Stop reason: HOSPADM

## 2023-03-26 RX ORDER — ASPIRIN 81 MG/1
81 TABLET ORAL DAILY
Status: DISCONTINUED | OUTPATIENT
Start: 2023-03-26 | End: 2023-03-27 | Stop reason: HOSPADM

## 2023-03-26 RX ORDER — DEXTROSE 40 %
30 GEL (GRAM) ORAL
Status: DISCONTINUED | OUTPATIENT
Start: 2023-03-26 | End: 2023-03-27 | Stop reason: HOSPADM

## 2023-03-26 RX ORDER — ATORVASTATIN CALCIUM 40 MG/1
40 TABLET, FILM COATED ORAL DAILY
Status: DISCONTINUED | OUTPATIENT
Start: 2023-03-26 | End: 2023-03-27 | Stop reason: HOSPADM

## 2023-03-26 RX ADMIN — DULOXETINE 30 MG: 30 CAPSULE, DELAYED RELEASE ORAL at 08:03

## 2023-03-26 RX ADMIN — ATORVASTATIN CALCIUM 40 MG: 40 TABLET, FILM COATED ORAL at 08:03

## 2023-03-26 RX ADMIN — Medication 2000 UNITS: at 08:03

## 2023-03-26 RX ADMIN — ONDANSETRON 4 MG: 2 INJECTION INTRAMUSCULAR; INTRAVENOUS at 09:03

## 2023-03-26 RX ADMIN — SODIUM CHLORIDE: 9 INJECTION, SOLUTION INTRAVENOUS at 08:03

## 2023-03-26 RX ADMIN — INSULIN DETEMIR 10 UNITS: 100 INJECTION, SOLUTION SUBCUTANEOUS at 08:03

## 2023-03-26 RX ADMIN — ASPIRIN 81 MG: 81 TABLET, COATED ORAL at 08:03

## 2023-03-26 RX ADMIN — SODIUM CHLORIDE: 9 INJECTION, SOLUTION INTRAVENOUS at 04:03

## 2023-03-26 RX ADMIN — ONDANSETRON 4 MG: 2 INJECTION INTRAMUSCULAR; INTRAVENOUS at 06:03

## 2023-03-26 NOTE — PLAN OF CARE
Patient is awake, alert and oriented x 4. Vitally the patient is stable and denies any concerns regarding treatment. Will continue to monitor the patient and administer scheduled medications.

## 2023-03-26 NOTE — PLAN OF CARE
OT eval completed per MD order. Acute OT services are recommended. D/C REC SNF VS HHOT with supervision.

## 2023-03-26 NOTE — ASSESSMENT & PLAN NOTE
-likely secondary to gastroenteritis, nausea, vomiting.    -orthostatics within normal limits.    -CT head negative for acute intracranial pathology.    -EKG sinus bradycardia.    -monitor on telemetry.    -continue IV fluids.  03/26/2023  --Orthostatics positive  --Echo largely unremarkable  --IVFs restarted, encouraged PO intake  --PT/OT consulted to assess gait stability and for any post acute therapy needs  --maintain precautions  --monitor overnight, repeat orthostatics in the AM

## 2023-03-26 NOTE — ASSESSMENT & PLAN NOTE
-HR in the 50s.    -not on beta-blockers.    -monitor on telemetry    03/26/2023  -mild, per history from family, seems to be chronic  -if patient continues to be symptomatic in the AM, recommend Cards consult for pacemaker evaluation

## 2023-03-26 NOTE — PLAN OF CARE
[facility] - Med Surg  Initial Discharge Assessment       Primary Care Provider: Janis Lubin MD    Admission Diagnosis: Gastroenteritis [K52.9]  Vomiting [R11.10]  Syncope, unspecified syncope type [R55]    Admission Date: 3/24/2023  Expected Discharge Date:     Discharge Barriers Identified: None    Payor: [employer]ARE / Plan: [employer] 65 / Product Type: Medicare Advantage /     Extended Emergency Contact Information  Primary Emergency Contact: Yisel Myles  Mobile Phone: [phone]  Relation: Daughter    Discharge Plan A: Home  Discharge Plan B: Home Health      [id number] - [place], [place] - [address] AT AT [facility]  [address]  [facility] [address]  Phone: [phone] Fax: [phone]      Initial Assessment (most recent)       Adult Discharge Assessment - 03/26/23 1716          Discharge Assessment    Assessment Type Discharge Planning Assessment     Confirmed/corrected address, phone number and insurance Yes     Confirmed Demographics Correct on Facesheet     Source of Information patient     Communicated PJ with patient/caregiver Date not available/Unable to determine     People in Home alone     Do you expect to return to your current living situation? Other (see comments)   will go to daughters short term    Do you have help at home or someone to help you manage your care at home? Yes     Who are your caregiver(s) and their phone number(s)? daughter     Prior to hospitilization cognitive status: Alert/Oriented     Current cognitive status: Alert/Oriented     Walking or Climbing Stairs ambulation difficulty, requires equipment     Mobility Management wc and walker     Dressing/Bathing --   independent    Equipment Currently Used at Home walker, rolling;rollator;cane, quad;shower chair     Readmission within 30 days? No     Patient currently being followed by outpatient case management? No     Do you currently have  service(s) that help you manage your care at home? No     Do you take prescription medications? Yes     Do you have prescription coverage? Yes     Do you have any problems affording any of your prescribed medications? No     Is the patient taking medications as prescribed? yes     Who is going to help you get home at discharge? daughter     How do you get to doctors appointments? family or friend will provide     Are you on dialysis? No     Do you take coumadin? No     Discharge Plan A Home     Discharge Plan B Home Health     DME Needed Upon Discharge  none     Discharge Plan discussed with: Patient     Discharge Barriers Identified None                     Patient reports that she lives alone, uses wc and walker for mobility, does not have home health.  Plan is to go to daughter's home when discharged.

## 2023-03-26 NOTE — SUBJECTIVE & OBJECTIVE
Interval History: No acute events overnight. Doing well this AM with no complaints at our encounter. Denies CP, SOB, Abdominal pain, fevers/chills. Hadn't worked with PT/OT at the time of this encounter    Review of Systems  Objective:     Vital Signs (Most Recent):  Temp: 98.3 °F (36.8 °C) (03/26/23 1630)  Pulse: (!) 56 (03/26/23 1630)  Resp: 18 (03/26/23 1630)  BP: 134/64 (03/26/23 1630)  SpO2: (!) 94 % (03/26/23 1630)   Vital Signs (24h Range):  Temp:  [98.1 °F (36.7 °C)-98.7 °F (37.1 °C)] 98.3 °F (36.8 °C)  Pulse:  [56-68] 56  Resp:  [16-18] 18  SpO2:  [94 %-97 %] 94 %  BP: (105-164)/(56-80) 134/64     Weight: 92.6 kg (204 lb 2.3 oz)  Body mass index is 33.97 kg/m².    Intake/Output Summary (Last 24 hours) at 3/26/2023 1650  Last data filed at 3/26/2023 1031  Gross per 24 hour   Intake 2075.82 ml   Output 1700 ml   Net 375.82 ml      Physical Exam  GEN: No acute distress, pleasant, body habitus normal  HEENT: atraumatic and normocephalic  CARDS: regular rate and rhythm, no m/g, pulses palpable in LE  PULM: breathing comfortably on room air, chest symmetric, nonlabored, no abnormal breath sounds on auscultation  ABD: nontender, nondistended, soft, no organomegaly, BS+  Neuro: Alert and oriented x3, CN's I-IX grossly intact, sensation and motor intact; follows directions and answers questions appropriately    Significant Labs: BMP:   Recent Labs   Lab 03/26/23  0608   *      K 3.5      CO2 25   BUN 13   CREATININE 0.8   CALCIUM 8.4*   MG 1.4*     CBC:   Recent Labs   Lab 03/24/23  1720 03/26/23  0608   WBC 9.59 6.69   HGB 10.7* 10.1*   HCT 34.2* 32.7*    320     CMP:   Recent Labs   Lab 03/24/23  1720 03/26/23  0608    142   K 4.1 3.5    108   CO2 21* 25   * 140*   BUN 16 13   CREATININE 1.4 0.8   CALCIUM 9.6 8.4*   PROT 7.4 6.2   ALBUMIN 3.7 3.0*   BILITOT 0.5 0.3   ALKPHOS 102 89   AST 19 14   ALT 14 11   ANIONGAP 16 9     Cardiac Markers: No results for input(s):  CKMB, MYOGLOBIN, BNP, TROPISTAT in the last 48 hours.  Coagulation: No results for input(s): PT, INR, APTT in the last 48 hours.  Lactic Acid:   Recent Labs   Lab 03/24/23  1720 03/24/23  2044   LACTATE 4.1* 1.7     Magnesium:   Recent Labs   Lab 03/26/23  0608   MG 1.4*     Troponin:   Recent Labs   Lab 03/25/23  0238 03/25/23  0753   TROPONINI <0.006 <0.006     TSH: No results for input(s): TSH in the last 4320 hours.  Urine Studies:   Recent Labs   Lab 03/24/23  1925   COLORU Colorless*   APPEARANCEUA Clear   PHUR 7.0   SPECGRAV 1.010   PROTEINUA Negative   GLUCUA Negative   KETONESU Trace*   BILIRUBINUA Negative   OCCULTUA Negative   NITRITE Negative   UROBILINOGEN Negative   LEUKOCYTESUR Trace*   WBCUA 1   BACTERIA Rare   HYALINECASTS 7*         Significant Imaging: I have reviewed all pertinent imaging results/findings within the past 24 hours.  Imaging Results              CT Head Without Contrast (Final result)  Result time 03/25/23 07:04:35      Final result by Jenaro Alves MD (03/25/23 07:04:35)                   Impression:      No evidence of acute intracranial pathology.  Consider further evaluation as warranted.  Chronic findings as above.      Electronically signed by: Jenaro Alves  Date:    03/25/2023  Time:    07:04               Narrative:    EXAMINATION:  CT HEAD WITHOUT CONTRAST    CLINICAL HISTORY:  Mental status change, unknown cause;    TECHNIQUE:  Low dose axial CT images obtained throughout the head without the use of intravenous contrast.  Axial, sagittal and coronal reconstructions were performed. All CT scans at this location are performed using dose modulation techniques as appropriate to a performed exam including the following: Automated exposure control; adjustment of the mA and/or kV according to patient size (this includes techniques or standardized protocols for targeted exams where dose is matched to indication/reason for exam; i. e. extremities or head); use of iterative  reconstruction technique.    COMPARISON:  None.    FINDINGS:  Intracranial compartment:    Mild generalized cerebral volume loss and periventricular hypoattenuation suggesting mild chronic microvascular ischemic changes.  No parenchymal mass, hemorrhage, edema or major vascular distribution infarct. No evidence of hydrocephalus.    No extra-axial blood or fluid collections.    Skull/extracranial contents (limited evaluation):    No fracture. Mastoid air cells and paranasal sinuses are essentially clear.                                       X-Ray Chest AP Portable (Final result)  Result time 03/24/23 18:52:27      Final result by Harry Chamorro MD (03/24/23 18:52:27)                   Impression:      No acute abnormality.      Electronically signed by: Harry Chamorro  Date:    03/24/2023  Time:    18:52               Narrative:    EXAMINATION:  XR CHEST AP PORTABLE    CLINICAL HISTORY:  Sepsis;    TECHNIQUE:  Single frontal view of the chest was performed.    COMPARISON:  None    FINDINGS:  The lungs are clear, with normal appearance of pulmonary vasculature and no pleural effusion or pneumothorax.    The cardiac silhouette is normal in size. The hilar and mediastinal contours are unremarkable.    Bones are intact.                                       CT Abdomen Pelvis  Without Contrast (Final result)  Result time 03/24/23 18:47:20      Final result by Miladys Fields MD (03/24/23 18:47:20)                   Impression:      No overt acute abdominopelvic finding      Electronically signed by: Miladys Fields  Date:    03/24/2023  Time:    18:47               Narrative:    EXAMINATION:  CT ABDOMEN PELVIS WITHOUT CONTRAST    CLINICAL HISTORY:  Abdominal pain, acute, nonlocalized;    TECHNIQUE:  Low dose axial images, sagittal and coronal reformations were obtained from the lung bases to the pubic symphysis.  Contrast was not administered.    COMPARISON:  None    FINDINGS:  Unenhanced liver and spleen normal  size.  Gallbladder present    Pancreas unremarkable    Kidneys without hydronephrosis.  Adrenal glands minimally enlarged diffusely    Urinary bladder moderately distended    No obstructive bowel findings.  Small hiatal hernia.  Pericecal surgical change    Small fat containing umbilical hernia    Multilevel spondylosis marked with canal stenosis

## 2023-03-26 NOTE — PROGRESS NOTES
[facility] Medicine  Progress Note    Patient Name: Patti Bragg  MRN: [id number]  Patient Class: OP- Observation   Admission Date: 3/24/2023  Length of Stay: 0 days  Attending Physician: Juaquin Puckett MD  Primary Care Provider: Janis Lubin MD        Subjective:     Principal Problem:Syncope        HPI:  Ms. Bragg is a 77-year-old  female with PMH significant for HTN, insulin-dependent diabetes, hyperlipidemia, presented to the ED after sustaining a syncopal episode.  Patient had a dinner at a restaurant, and shortly thereafter she had one episode of loose watery bowel movements, and while shopping at [employer], she felt lightheaded, dizzy and passed out.  Did not hit her head.  She has been complaining of nausea, abdominal discomfort.  On presentation to the ED, initial lactic acid 4.1, received 2 L NS boluses, improved to 1.7.  CT abdomen is unremarkable.  Hemodynamically stable, BP systolic 120s to 140s.  HR 50s to 70s.  WBC 9.5.  Rest of the laboratory workup is unremarkable.  CT head negative for acute intracranial pathology.  Orthostatic vital signs within normal limits.  However patient could be discharged from the ED due to dizziness upon standing and difficulty ambulating out of the room.    Place in observation for syncope.  Likely secondary to gastroenteritis.      Overview/Hospital Course:  No notes on file    Interval History: No acute events overnight. Doing well this AM with no complaints at our encounter. Denies CP, SOB, Abdominal pain, fevers/chills. Hadn't worked with PT/OT at the time of this encounter    Review of Systems  Objective:     Vital Signs (Most Recent):  Temp: 98.3 °F (36.8 °C) (03/26/23 1630)  Pulse: (!) 56 (03/26/23 1630)  Resp: 18 (03/26/23 1630)  BP: 134/64 (03/26/23 1630)  SpO2: (!) 94 % (03/26/23 1630)   Vital Signs (24h Range):  Temp:  [98.1 °F (36.7 °C)-98.7 °F (37.1 °C)] 98.3 °F (36.8 °C)  Pulse:  [56-68] 56  Resp:  [16-18] 18  SpO2:   [94 %-97 %] 94 %  BP: (105-164)/(56-80) 134/64     Weight: 92.6 kg (204 lb 2.3 oz)  Body mass index is 33.97 kg/m².    Intake/Output Summary (Last 24 hours) at 3/26/2023 1650  Last data filed at 3/26/2023 1031  Gross per 24 hour   Intake 2075.82 ml   Output 1700 ml   Net 375.82 ml      Physical Exam  GEN: No acute distress, pleasant, body habitus normal  HEENT: atraumatic and normocephalic  CARDS: regular rate and rhythm, no m/g, pulses palpable in LE  PULM: breathing comfortably on room air, chest symmetric, nonlabored, no abnormal breath sounds on auscultation  ABD: nontender, nondistended, soft, no organomegaly, BS+  Neuro: Alert and oriented x3, CN's I-IX grossly intact, sensation and motor intact; follows directions and answers questions appropriately    Significant Labs: BMP:   Recent Labs   Lab 03/26/23  0608   *      K 3.5      CO2 25   BUN 13   CREATININE 0.8   CALCIUM 8.4*   MG 1.4*     CBC:   Recent Labs   Lab 03/24/23  1720 03/26/23  0608   WBC 9.59 6.69   HGB 10.7* 10.1*   HCT 34.2* 32.7*    320     CMP:   Recent Labs   Lab 03/24/23  1720 03/26/23  0608    142   K 4.1 3.5    108   CO2 21* 25   * 140*   BUN 16 13   CREATININE 1.4 0.8   CALCIUM 9.6 8.4*   PROT 7.4 6.2   ALBUMIN 3.7 3.0*   BILITOT 0.5 0.3   ALKPHOS 102 89   AST 19 14   ALT 14 11   ANIONGAP 16 9     Cardiac Markers: No results for input(s): CKMB, MYOGLOBIN, BNP, TROPISTAT in the last 48 hours.  Coagulation: No results for input(s): PT, INR, APTT in the last 48 hours.  Lactic Acid:   Recent Labs   Lab 03/24/23  1720 03/24/23  2044   LACTATE 4.1* 1.7     Magnesium:   Recent Labs   Lab 03/26/23  0608   MG 1.4*     Troponin:   Recent Labs   Lab 03/25/23  0238 03/25/23  0753   TROPONINI <0.006 <0.006     TSH: No results for input(s): TSH in the last 4320 hours.  Urine Studies:   Recent Labs   Lab 03/24/23  1925   COLORU Colorless*   APPEARANCEUA Clear   PHUR 7.0   SPECGRAV 1.010   PROTEINUA Negative    GLUCUA Negative   KETONESU Trace*   BILIRUBINUA Negative   OCCULTUA Negative   NITRITE Negative   UROBILINOGEN Negative   LEUKOCYTESUR Trace*   WBCUA 1   BACTERIA Rare   HYALINECASTS 7*         Significant Imaging: I have reviewed all pertinent imaging results/findings within the past 24 hours.  Imaging Results              CT Head Without Contrast (Final result)  Result time 03/25/23 07:04:35      Final result by Jenaro Alves MD (03/25/23 07:04:35)                   Impression:      No evidence of acute intracranial pathology.  Consider further evaluation as warranted.  Chronic findings as above.      Electronically signed by: Jenaro Alves  Date:    03/25/2023  Time:    07:04               Narrative:    EXAMINATION:  CT HEAD WITHOUT CONTRAST    CLINICAL HISTORY:  Mental status change, unknown cause;    TECHNIQUE:  Low dose axial CT images obtained throughout the head without the use of intravenous contrast.  Axial, sagittal and coronal reconstructions were performed. All CT scans at this location are performed using dose modulation techniques as appropriate to a performed exam including the following: Automated exposure control; adjustment of the mA and/or kV according to patient size (this includes techniques or standardized protocols for targeted exams where dose is matched to indication/reason for exam; i. e. extremities or head); use of iterative reconstruction technique.    COMPARISON:  None.    FINDINGS:  Intracranial compartment:    Mild generalized cerebral volume loss and periventricular hypoattenuation suggesting mild chronic microvascular ischemic changes.  No parenchymal mass, hemorrhage, edema or major vascular distribution infarct. No evidence of hydrocephalus.    No extra-axial blood or fluid collections.    Skull/extracranial contents (limited evaluation):    No fracture. Mastoid air cells and paranasal sinuses are essentially clear.                                       X-Ray Chest AP  Portable (Final result)  Result time 03/24/23 18:52:27      Final result by Harry Chamorro MD (03/24/23 18:52:27)                   Impression:      No acute abnormality.      Electronically signed by: Harry Chamorro  Date:    03/24/2023  Time:    18:52               Narrative:    EXAMINATION:  XR CHEST AP PORTABLE    CLINICAL HISTORY:  Sepsis;    TECHNIQUE:  Single frontal view of the chest was performed.    COMPARISON:  None    FINDINGS:  The lungs are clear, with normal appearance of pulmonary vasculature and no pleural effusion or pneumothorax.    The cardiac silhouette is normal in size. The hilar and mediastinal contours are unremarkable.    Bones are intact.                                       CT Abdomen Pelvis  Without Contrast (Final result)  Result time 03/24/23 18:47:20      Final result by Miladys Fields MD (03/24/23 18:47:20)                   Impression:      No overt acute abdominopelvic finding      Electronically signed by: Miladys Fields  Date:    03/24/2023  Time:    18:47               Narrative:    EXAMINATION:  CT ABDOMEN PELVIS WITHOUT CONTRAST    CLINICAL HISTORY:  Abdominal pain, acute, nonlocalized;    TECHNIQUE:  Low dose axial images, sagittal and coronal reformations were obtained from the lung bases to the pubic symphysis.  Contrast was not administered.    COMPARISON:  None    FINDINGS:  Unenhanced liver and spleen normal size.  Gallbladder present    Pancreas unremarkable    Kidneys without hydronephrosis.  Adrenal glands minimally enlarged diffusely    Urinary bladder moderately distended    No obstructive bowel findings.  Small hiatal hernia.  Pericecal surgical change    Small fat containing umbilical hernia    Multilevel spondylosis marked with canal stenosis                                          Assessment/Plan:      * Syncope  -likely secondary to gastroenteritis, nausea, vomiting.    -orthostatics within normal limits.    -CT head negative for acute intracranial  pathology.    -EKG sinus bradycardia.    -monitor on telemetry.    -continue IV fluids.  03/26/2023  --Orthostatics positive  --Echo largely unremarkable  --IVFs restarted, encouraged PO intake  --PT/OT consulted to assess gait stability and for any post acute therapy needs  --maintain precautions  --monitor overnight, repeat orthostatics in the AM      Sinus bradycardia  -HR in the 50s.    -not on beta-blockers.    -monitor on telemetry    03/26/2023  -mild, per history from family, seems to be chronic  -if patient continues to be symptomatic in the AM, recommend Cards consult for pacemaker evaluation    Lactic acidosis  -initial lactic acid 4.1, has improved to 1.7 with IV fluids.    -no evidence of leukocytosis.  No fever, chills.    03/26/2023  resolved      Gastroenteritis  -likely secondary to food poisoning  -IV fluids.    -supportive care.  03/26/2023  Asymptomatic since admission      VTE Risk Mitigation (From admission, onward)         Ordered     Place sequential compression device  Until discontinued         03/25/23 0128                Discharge Planning   PJ:      Code Status: Not on file   Is the patient medically ready for discharge?:     Reason for patient still in hospital (select all that apply): Patient trending condition and PT / OT recommendations                     Juaquin Puckett MD  Department of Hospital Medicine   [facility] - Med Surg

## 2023-03-26 NOTE — ASSESSMENT & PLAN NOTE
-likely secondary to food poisoning  -IV fluids.    -supportive care.  03/26/2023  Asymptomatic since admission

## 2023-03-26 NOTE — ASSESSMENT & PLAN NOTE
-initial lactic acid 4.1, has improved to 1.7 with IV fluids.    -no evidence of leukocytosis.  No fever, chills.    03/26/2023  resolved

## 2023-03-26 NOTE — PLAN OF CARE
Discussed plan of care with pt. Pt verbalized understanding. Pt requested a shower tonight. When attempting to get pt out of bed, pt was too dizzy to stand. PCT notified and bed bath was given. No signs of acute distress, pt resting well with bed at lowest position. Daughter at the bedside. Call light within reach. Purposeful rounding Q2h.      Chart check complete.       Problem: Adult Inpatient Plan of Care  Goal: Plan of Care Review  Outcome: Ongoing, Progressing  Goal: Patient-Specific Goal (Individualized)  Outcome: Ongoing, Progressing  Goal: Absence of Hospital-Acquired Illness or Injury  Outcome: Ongoing, Progressing  Goal: Optimal Comfort and Wellbeing  Outcome: Ongoing, Progressing  Goal: Readiness for Transition of Care  Outcome: Ongoing, Progressing     Problem: Skin Injury Risk Increased  Goal: Skin Health and Integrity  Outcome: Ongoing, Progressing     Problem: Pain Acute  Goal: Acceptable Pain Control and Functional Ability  Outcome: Ongoing, Progressing     Problem: Fall Injury Risk  Goal: Absence of Fall and Fall-Related Injury  Outcome: Ongoing, Progressing

## 2023-03-26 NOTE — HOSPITAL COURSE
Pt admitted to Observation for Syncope. Failed orthostatics on 3/26/2023 with IVF's restarted. Echo, Head CT unremarkable, but patient persistently bradycardic in the 50s, which seems to be chronic. Carotid US showed no clinically significant area of stenosis. Lactic acidosis resolved. Pt evaluated by PT/OT with home health vs SNF recommended.  Son at bedside reported pt engaged in daily fasting due to Quaker purposes. Pt/family encouraged to refrain from daily fasting and to increase oral intake and hydration with understanding verbalized. Magnesium and potassium replaced and dietary supplementation encouraged. No nausea, vomiting, or diarrhea witnessed or reported. Pt seen and examined and deemed stable for discharge to home. Vital signs stable with HR in 80's and orthostatics negative. Pt ambulatory in hallway with walker and dizziness denied. Pt elected to discharge to daughter's home with home health established. Medications reconciled for discharge. Pt to follow up with PCP and cardiology upon discharge for further evaluation.

## 2023-03-26 NOTE — PT/OT/SLP EVAL
Occupational Therapy   Evaluation    Name: Patti Bragg  MRN: [id number]  Admitting Diagnosis: Syncope  Recent Surgery: * No surgery found *      Recommendations:     Discharge Recommendations: nursing facility, skilled, home health OT, other (see comments) (pending progress)  Discharge Equipment Recommendations:  bedside commode  Barriers to discharge:  None    Assessment:     Patti Bragg is a 77 y.o. female with a medical diagnosis of Syncope.  She presents with generalized weakness and debility. Performance deficits affecting function: weakness, impaired endurance, impaired self care skills, gait instability, impaired functional mobility, impaired balance, decreased lower extremity function, impaired cardiopulmonary response to activity.      Rehab Prognosis: Good; patient would benefit from acute skilled OT services to address these deficits and reach maximum level of function.       Plan:     Patient to be seen 2 x/week to address the above listed problems via self-care/home management, therapeutic activities, therapeutic exercises  Plan of Care Expires: 04/09/23  Plan of Care Reviewed with: patient, son    Subjective     Chief Complaint: Generalized weakness and debility  Patient/Family Comments/goals: To return to [facility]     Occupational Profile:  Living Environment: Pt lives in [facility] alone. No steps   Previous level of function: Pt Mod I with ADLs and Mod I FM with quad cane and/or rollator pending distance  Roles and Routines: Pt does not drive or work   Equipment Used at Home: walker, rolling, rollator, cane, quad, shower chair  Assistance upon Discharge: Unknown     Pain/Comfort:  Pain Rating 1: 0/10    Patients cultural, spiritual, Quaker conflicts given the current situation: no    Objective:     Communicated with: Nurse Leslie and epic chart review prior to session.  Patient found supine with peripheral IV, telemetry, PureWick upon OT entry to room.    General Precautions: Standard, fall  Orthopedic  Precautions: N/A  Braces: N/A  Respiratory Status: Room air    Occupational Performance:    Bed Mobility:    Patient completed Rolling/Turning to Left with  supervision  Patient completed Scooting/Bridging with supervision  Patient completed Supine to Sit with supervision  With increased time and use of bed rails      Functional Mobility/Transfers:  Patient completed Sit <> Stand Transfer with moderate assistance  with  rolling walker   Patient completed Bed <> Chair Transfer using Step Transfer technique with moderate assistance with quad cane  Functional Mobility: Pt took 4 steps next to bed with RW; took sitting break then from bed to chair with quad cane via stand pivot. Pt reported feeling weak and fatigue with short steps next to bed.   Pt reported feeling dizzy with transition from supine to sit and sit to supine; symptoms resolved with rest break in position.     Activities of Daily Living:  Toileting: total assistance ; purewick at eval     Cognitive/Visual Perceptual:  Cognitive/Psychosocial Skills:     -       Oriented to: Person, Place, Time, and Situation   -       Follows Commands/attention:Follows two-step commands  -       Communication: clear/fluent  -       Safety awareness/insight to disability: intact     Physical Exam:  Upper Extremity Range of Motion:     -       Right Upper Extremity: WFL  -       Left Upper Extremity: WFL  Upper Extremity Strength:    -       Right Upper Extremity: 4+/5  -       Left Upper Extremity: 4+/5   Strength:    -       Right Upper Extremity: WFL  -       Left Upper Extremity: WFL    AMPAC 6 Click ADL:  AMPAC Total Score: 17    Treatment & Education:  OT eval and tx completed per MD order. Based on PLOF and performance this date, acute OT services are recommended. Pt educated on purpose of occupational therapy and benefits of active participation. Pt educated on fall prevention and use of call button. Pt educated on benefits of OOB ax and completion of therex to  improve endurance and muscle strength. Pt verbalized understanding of all education.       Patient left up in chair with all lines intact, call button in reach, and son present    GOALS:   Multidisciplinary Problems       Occupational Therapy Goals          Problem: Occupational Therapy    Goal Priority Disciplines Outcome Interventions   Occupational Therapy Goal     OT, PT/OT Ongoing, Progressing    Description: To improve safety and increase independence during ADLs and functional tasks:    Pt will complete BSC/standard commode transfer supervision   Pt will complete LE dressing Supervision   Pt will complete g/h standing at sink Mod I                         History:     Past Medical History:   Diagnosis Date    Diabetes mellitus     Hypertension     Mixed hyperlipidemia        History reviewed. No pertinent surgical history.    Time Tracking:     OT Date of Treatment: 03/26/23  OT Start Time: 1300  OT Stop Time: 1315  OT Total Time (min): 15 min    Billable Minutes:Evaluation 15 Mins    3/26/2023

## 2023-03-27 VITALS
BODY MASS INDEX: 34.38 KG/M2 | RESPIRATION RATE: 18 BRPM | OXYGEN SATURATION: 97 % | SYSTOLIC BLOOD PRESSURE: 154 MMHG | HEART RATE: 85 BPM | HEIGHT: 65 IN | DIASTOLIC BLOOD PRESSURE: 75 MMHG | WEIGHT: 206.38 LBS | TEMPERATURE: 98 F

## 2023-03-27 LAB
ANION GAP SERPL CALC-SCNC: 8 MMOL/L (ref 8–16)
BASOPHILS # BLD AUTO: 0.02 K/UL (ref 0–0.2)
BASOPHILS NFR BLD: 0.3 % (ref 0–1.9)
BUN SERPL-MCNC: 7 MG/DL (ref 8–23)
CALCIUM SERPL-MCNC: 8.5 MG/DL (ref 8.7–10.5)
CHLORIDE SERPL-SCNC: 106 MMOL/L (ref 95–110)
CO2 SERPL-SCNC: 29 MMOL/L (ref 23–29)
CREAT SERPL-MCNC: 0.7 MG/DL (ref 0.5–1.4)
DIFFERENTIAL METHOD: ABNORMAL
EOSINOPHIL # BLD AUTO: 0.2 K/UL (ref 0–0.5)
EOSINOPHIL NFR BLD: 2.6 % (ref 0–8)
ERYTHROCYTE [DISTWIDTH] IN BLOOD BY AUTOMATED COUNT: 13.9 % (ref 11.5–14.5)
EST. GFR  (NO RACE VARIABLE): >60 ML/MIN/1.73 M^2
GLUCOSE SERPL-MCNC: 110 MG/DL (ref 70–110)
HCT VFR BLD AUTO: 32.4 % (ref 37–48.5)
HGB BLD-MCNC: 10.1 G/DL (ref 12–16)
IMM GRANULOCYTES # BLD AUTO: 0.02 K/UL (ref 0–0.04)
IMM GRANULOCYTES NFR BLD AUTO: 0.3 % (ref 0–0.5)
LYMPHOCYTES # BLD AUTO: 1.9 K/UL (ref 1–4.8)
LYMPHOCYTES NFR BLD: 30.3 % (ref 18–48)
MAGNESIUM SERPL-MCNC: 1.3 MG/DL (ref 1.6–2.6)
MCH RBC QN AUTO: 26 PG (ref 27–31)
MCHC RBC AUTO-ENTMCNC: 31.2 G/DL (ref 32–36)
MCV RBC AUTO: 84 FL (ref 82–98)
MONOCYTES # BLD AUTO: 0.7 K/UL (ref 0.3–1)
MONOCYTES NFR BLD: 11.2 % (ref 4–15)
NEUTROPHILS # BLD AUTO: 3.4 K/UL (ref 1.8–7.7)
NEUTROPHILS NFR BLD: 55.3 % (ref 38–73)
NRBC BLD-RTO: 0 /100 WBC
PLATELET # BLD AUTO: 313 K/UL (ref 150–450)
PMV BLD AUTO: 10 FL (ref 9.2–12.9)
POCT GLUCOSE: 118 MG/DL (ref 70–110)
POCT GLUCOSE: 144 MG/DL (ref 70–110)
POTASSIUM SERPL-SCNC: 3.4 MMOL/L (ref 3.5–5.1)
RBC # BLD AUTO: 3.88 M/UL (ref 4–5.4)
SODIUM SERPL-SCNC: 143 MMOL/L (ref 136–145)
WBC # BLD AUTO: 6.17 K/UL (ref 3.9–12.7)

## 2023-03-27 PROCEDURE — 80048 BASIC METABOLIC PNL TOTAL CA: CPT | Performed by: NURSE PRACTITIONER

## 2023-03-27 PROCEDURE — 96365 THER/PROPH/DIAG IV INF INIT: CPT | Mod: 59

## 2023-03-27 PROCEDURE — 96361 HYDRATE IV INFUSION ADD-ON: CPT

## 2023-03-27 PROCEDURE — 96366 THER/PROPH/DIAG IV INF ADDON: CPT

## 2023-03-27 PROCEDURE — 63600175 PHARM REV CODE 636 W HCPCS: Performed by: NURSE PRACTITIONER

## 2023-03-27 PROCEDURE — G0378 HOSPITAL OBSERVATION PER HR: HCPCS

## 2023-03-27 PROCEDURE — 25000003 PHARM REV CODE 250: Performed by: STUDENT IN AN ORGANIZED HEALTH CARE EDUCATION/TRAINING PROGRAM

## 2023-03-27 PROCEDURE — 25000003 PHARM REV CODE 250: Performed by: NURSE PRACTITIONER

## 2023-03-27 PROCEDURE — 85025 COMPLETE CBC W/AUTO DIFF WBC: CPT | Performed by: NURSE PRACTITIONER

## 2023-03-27 PROCEDURE — 83735 ASSAY OF MAGNESIUM: CPT | Performed by: NURSE PRACTITIONER

## 2023-03-27 PROCEDURE — 97110 THERAPEUTIC EXERCISES: CPT

## 2023-03-27 PROCEDURE — 97162 PT EVAL MOD COMPLEX 30 MIN: CPT

## 2023-03-27 PROCEDURE — 36415 COLL VENOUS BLD VENIPUNCTURE: CPT | Performed by: NURSE PRACTITIONER

## 2023-03-27 PROCEDURE — 97116 GAIT TRAINING THERAPY: CPT

## 2023-03-27 PROCEDURE — 97530 THERAPEUTIC ACTIVITIES: CPT

## 2023-03-27 RX ORDER — POTASSIUM CHLORIDE 20 MEQ/1
20 TABLET, EXTENDED RELEASE ORAL ONCE
Status: COMPLETED | OUTPATIENT
Start: 2023-03-27 | End: 2023-03-27

## 2023-03-27 RX ORDER — SODIUM CHLORIDE 9 MG/ML
INJECTION, SOLUTION INTRAVENOUS CONTINUOUS
Status: DISCONTINUED | OUTPATIENT
Start: 2023-03-27 | End: 2023-03-27

## 2023-03-27 RX ORDER — MAGNESIUM SULFATE HEPTAHYDRATE 40 MG/ML
2 INJECTION, SOLUTION INTRAVENOUS ONCE
Status: COMPLETED | OUTPATIENT
Start: 2023-03-27 | End: 2023-03-27

## 2023-03-27 RX ADMIN — MAGNESIUM SULFATE HEPTAHYDRATE 2 G: 40 INJECTION, SOLUTION INTRAVENOUS at 12:03

## 2023-03-27 RX ADMIN — DULOXETINE 30 MG: 30 CAPSULE, DELAYED RELEASE ORAL at 09:03

## 2023-03-27 RX ADMIN — ASPIRIN 81 MG: 81 TABLET, COATED ORAL at 09:03

## 2023-03-27 RX ADMIN — Medication 2000 UNITS: at 09:03

## 2023-03-27 RX ADMIN — SODIUM CHLORIDE: 9 INJECTION, SOLUTION INTRAVENOUS at 12:03

## 2023-03-27 RX ADMIN — ATORVASTATIN CALCIUM 40 MG: 40 TABLET, FILM COATED ORAL at 09:03

## 2023-03-27 RX ADMIN — POTASSIUM CHLORIDE 20 MEQ: 1500 TABLET, EXTENDED RELEASE ORAL at 11:03

## 2023-03-27 NOTE — DISCHARGE INSTRUCTIONS
Maintain adequate hydration at home    Problem: Patient Care Overview  Goal: Plan of Care Review  Outcome: Ongoing (interventions implemented as appropriate)   09/17/19 1325 09/17/19 6432   Coping/Psychosocial   Plan of Care Reviewed With patient;family --    Plan of Care Review   Progress --  improving   OTHER   Outcome Summary --  Pt pain is going down. The lowest it was today is a 7. Her N&V is better, only needed PRN meds one time today. She asked for a turkey sandwhich.       Problem: Pain, Chronic (Adult)  Goal: Acceptable Pain/Comfort Level and Functional Ability  Outcome: Ongoing (interventions implemented as appropriate)      Problem: Nausea/Vomiting (Adult)  Goal: Symptom Relief  Outcome: Ongoing (interventions implemented as appropriate)    Goal: Adequate Hydration  Outcome: Ongoing (interventions implemented as appropriate)      Problem: Renal Failure/Kidney Injury, Acute (Adult)  Goal: Signs and Symptoms of Listed Potential Problems Will be Absent, Minimized or Managed (Renal Failure/Kidney Injury, Acute)  Outcome: Ongoing (interventions implemented as appropriate)

## 2023-03-27 NOTE — PLAN OF CARE
Patient being discharged home in stable condition per MD order. Patient remains free from injury/falls during stay.

## 2023-03-27 NOTE — PT/OT/SLP PROGRESS
Physical Therapy  Treatment    Patti Bragg   MRN: [id number]   Admitting Diagnosis: Syncope    PT Received On: 03/27/23  PT Start Time: 1015     PT Stop Time: 1040    PT Total Time (min): 25 min       Billable Minutes:  Gait Training 15 and Therapeutic Activity 10    Treatment Type: treatment  PT/PTA: PT     Number of PTA visits since last PT visit: 0       General Precautions: Standard, fall  Orthopedic Precautions: N/A  Braces: N/A  Respiratory Status: Room air    Spiritual, Cultural Beliefs, Sabianism Practices, Values that Affect Care: no    Subjective:  Communicated with nursing (Donna) and performed chart review via epic system prior to session.  Pt agreeable to PT services    Pain/Comfort  Pain Rating 1: 0/10    Objective:   Patient found with: telemetry, peripheral IV, Timmy    Functional Mobility:  Bed Mobility:    Mod I supine to sit and scooting to EOB    Transfers:   Sit<>stand and stand pivot transfers with SBA and CGA respectively    Gait:    Facilitated gait training 60ft x 2 CGA with RW, demonstrates slow pace, flexed posture, wide SHERRON, decreased step length and foot clearance    Balance:   Static Stand: FAIR+: Takes MINIMAL challenges from all directions  Dynamic stand: FAIR: Needs CONTACT GUARD during gait       Treatment and Education:  Educated pt on benefits of consistent participation in PT services to meet functional goals. Educated pt on supine/seated therex to promote strength and joint mobility. Exercises included AP. Educated to perform exercises intermittently throughout day to tolerance. Educated pt on importance of sitting OOB to promote endurance and overall activity tolerance. Educated pt on call don't fall policy and use of call button to alert nursing staff of needs (including to assist with returning back to bed). Pt expressed understanding.       AM-PAC 6 CLICK MOBILITY  How much help from another person does this patient currently need?   1 = Unable, Total/Dependent Assistance  2  = A lot, Maximum/Moderate Assistance  3 = A little, Minimum/Contact Guard/Supervision  4 = None, Modified [place]/Independent    Turning over in bed (including adjusting bedclothes, sheets and blankets)?: 3  Sitting down on and standing up from a chair with arms (e.g., wheelchair, bedside commode, etc.): 3  Moving from lying on back to sitting on the side of the bed?: 3  Moving to and from a bed to a chair (including a wheelchair)?: 3  Need to walk in hospital room?: 3  Climbing 3-5 steps with a railing?: 1  Basic Mobility Total Score: 16    AM-PAC Raw Score CMS G-Code Modifier Level of Impairment Assistance   6 % Total / Unable   7 - 9 CM 80 - 100% Maximal Assist   10 - 14 CL 60 - 80% Moderate Assist   15 - 19 CK 40 - 60% Moderate Assist   20 - 22 CJ 20 - 40% Minimal Assist   23 CI 1-20% SBA / CGA   24 CH 0% Independent/ Mod I     Patient left up in chair with all lines intact, call button in reach, nursing notified, and family and NP present.    Assessment:  Patti Bragg is a 77 y.o. female with a medical diagnosis of Syncope and presents with deficits in strength, balance and other impairments listed below. Pt able to tolerate increased gait distances today with RW but will benefit from continued PT services to progress toward baseline.    Rehab identified problem list/impairments: weakness, impaired endurance, impaired functional mobility, gait instability, decreased coordination, decreased safety awareness, decreased lower extremity function    Rehab potential is good.    Activity tolerance: Good    Discharge recommendations: home health PT      Barriers to discharge:      Equipment recommendations: none     GOALS:   Multidisciplinary Problems       Physical Therapy Goals          Problem: Physical Therapy    Goal Priority Disciplines Outcome Goal Variances Interventions   Physical Therapy Goal     PT, PT/OT      Description: Pt will perform bed mobility independently in order to participate in EOB  activity.  Pt will perform transfers independently in order to participate in OOB activity.   Pt will ambulate 100ft mod I with LRAD in order to participate in daily tasks.                         PLAN:    Patient to be seen 3 x/week to address the above listed problems via gait training, therapeutic activities, therapeutic exercises, neuromuscular re-education  Plan of Care expires: 04/10/23  Plan of Care reviewed with: daughter, son         03/27/2023

## 2023-03-27 NOTE — DISCHARGE SUMMARY
[facility] Medicine  Discharge Summary      Patient Name: Patti Bragg  MRN: [id number]  KWAKU: [id number]  Patient Class: OP- Observation  Admission Date: 3/24/2023  Hospital Length of Stay: 0 days  Discharge Date and Time: 3/27/2023  5:20 PM  Attending Physician: Dr. Aleyda Rowe    Discharging Provider: Kinjal Kinney NP  Primary Care Provider: Janis Lubin MD    Primary Care Team: Networked reference to record PCT     HPI:   Ms. Bragg is a 77-year-old  female with PMH significant for HTN, insulin-dependent diabetes, hyperlipidemia, presented to the ED after sustaining a syncopal episode.  Patient had a dinner at a restaurant, and shortly thereafter she had one episode of loose watery bowel movements, and while shopping at [employer], she felt lightheaded, dizzy and passed out.  Did not hit her head.  She has been complaining of nausea, abdominal discomfort.  On presentation to the ED, initial lactic acid 4.1, received 2 L NS boluses, improved to 1.7.  CT abdomen is unremarkable.  Hemodynamically stable, BP systolic 120s to 140s.  HR 50s to 70s.  WBC 9.5.  Rest of the laboratory workup is unremarkable.  CT head negative for acute intracranial pathology.  Orthostatic vital signs within normal limits.  However patient could be discharged from the ED due to dizziness upon standing and difficulty ambulating out of the room.    Place in observation for syncope.  Likely secondary to gastroenteritis.      * No surgery found *      Hospital Course:   Pt admitted to Observation for Syncope. Failed orthostatics on 3/26/2023 with IVF's restarted. Echo, Head CT unremarkable, but patient persistently bradycardic in the 50s, which seems to be chronic. Carotid US showed no clinically significant area of stenosis. Lactic acidosis resolved. Pt evaluated by PT/OT with home health vs SNF recommended.  Son at bedside reported pt engaged in daily fasting due to [employer] purposes. Pt/family  encouraged to refrain from daily fasting and to increase oral intake and hydration with understanding verbalized. Magnesium and potassium replaced and dietary supplementation encouraged. No nausea, vomiting, or diarrhea witnessed or reported. Pt seen and examined and deemed stable for discharge to home. Vital signs stable with HR in 80's and orthostatics negative. Pt ambulatory in hallway with walker and dizziness denied. Pt elected to discharge to daughter's home with home health established. Medications reconciled for discharge. Pt to follow up with PCP and cardiology upon discharge for further evaluation.        Goals of Care Treatment Preferences:         Consults:   Consults (From admission, onward)        Status Ordering Provider     Inpatient consult to Social Work  Once        Provider:  (Not yet assigned)    Completed FRENCH AHN consult to case management  Once        Provider:  (Not yet assigned)    Completed ROXANE NICHOLS          Final Active Diagnoses:    Diagnosis Date Noted POA    PRINCIPAL PROBLEM:  Syncope [R55] 03/25/2023 Yes    Gastroenteritis [K52.9] 03/25/2023 Yes    Lactic acidosis [E87.20] 03/25/2023 Yes    Sinus bradycardia [R00.1] 03/25/2023 Yes      Problems Resolved During this Admission:       Discharged Condition: stable    Disposition: Home-Health Care [facility]    Follow Up:   Follow-up Information     Janis Lubin MD Follow up in 3 day(s).    Specialty: Internal Medicine  Why: -hospital follow up  Contact information:  [address]  [address]  [place] [address] [phone]             JHONNY Garner Follow up in 1 week(s).    Specialty: Cardiology  Why: -hospital follow up- episodes of bradycardia  Contact information:  [address] [place] [address] [place] [address] [phone]                       Patient Instructions:      Ambulatory referral/consult to Home Health   Referral Priority: Routine Referral Type: Home Health   Referral Reason: Specialty  Services Required   Requested Specialty: Home Health Services   Number of Visits Requested: 1     Diet diabetic     Notify your health care provider if you experience any of the following:  temperature >100.4     Notify your health care provider if you experience any of the following:  persistent nausea and vomiting or diarrhea     Notify your health care provider if you experience any of the following:  increased confusion or weakness     Notify your health care provider if you experience any of the following:  persistent dizziness, light-headedness, or visual disturbances     Notify your health care provider if you experience any of the following:  difficulty breathing or increased cough     Notify your health care provider if you experience any of the following:  severe persistent headache     Activity as tolerated       Significant Diagnostic Studies: Labs: All labs within the past 24 hours have been reviewed    Pending Diagnostic Studies:     None         Medications:  Reconciled Home Medications:      Medication List      CONTINUE taking these medications    aspirin 81 MG EC tablet  Commonly known as: ECOTRIN  Take 81 mg by mouth once daily.     atorvastatin 40 MG tablet  Commonly known as: LIPITOR  Take 40 mg by mouth once daily.     azelastine 0.05 % ophthalmic solution  Commonly known as: OPTIVAR  Place 1 drop into both eyes 2 (two) times daily.     DULoxetine 30 MG capsule  Commonly known as: CYMBALTA  Take 30 mg by mouth once daily.     gabapentin 400 MG capsule  Commonly known as: NEURONTIN  Take 400 mg by mouth 3 (three) times daily.     meloxicam 7.5 MG tablet  Commonly known as: MOBIC  Take 7.5 mg by mouth once daily.     metFORMIN 1000 MG tablet  Commonly known as: GLUCOPHAGE  Take 1,000 mg by mouth daily with breakfast.     TRESIBA FLEXTOUCH U-200 SUBQ  Inject 10 Units into the skin nightly.     VITAMIN C 500 MG tablet  Generic drug: ascorbic acid (vitamin C)  Take 500 mg by mouth once daily.      vitamin D 1000 units Tab  Commonly known as: VITAMIN D3  Take 2,000 Units by mouth once daily.            Indwelling Lines/Drains at time of discharge:   Lines/Drains/Airways     None                 Time spent on the discharge of patient: > 38 minutes         Kinjal Kinney NP  Department of Hospital Medicine  [facility]

## 2023-03-27 NOTE — PLAN OF CARE
EVAL AND TX COMPLETED: facilitated bed mobility with mod I, transfers with SBA. Ambulated 60 ft x 2 with RW. Recommend HHPT and 24 hr spv/assist

## 2023-03-27 NOTE — PLAN OF CARE
Discussed plan of care with pt. Pt verbalized understanding. No signs of acute distress, pt resting well with bed at lowest position. Daughter at bedside. Call light within reach. Purposeful rounding Q2h.      Chart check complete.     Problem: Adult Inpatient Plan of Care  Goal: Plan of Care Review  Outcome: Ongoing, Progressing  Goal: Patient-Specific Goal (Individualized)  Outcome: Ongoing, Progressing  Goal: Absence of Hospital-Acquired Illness or Injury  Outcome: Ongoing, Progressing  Goal: Optimal Comfort and Wellbeing  Outcome: Ongoing, Progressing  Goal: Readiness for Transition of Care  Outcome: Ongoing, Progressing     Problem: Skin Injury Risk Increased  Goal: Skin Health and Integrity  Outcome: Ongoing, Progressing     Problem: Pain Acute  Goal: Acceptable Pain Control and Functional Ability  Outcome: Ongoing, Progressing     Problem: Fall Injury Risk  Goal: Absence of Fall and Fall-Related Injury  Outcome: Ongoing, Progressing

## 2023-03-27 NOTE — PLAN OF CARE
[facility] - Med Surg  Discharge Final Note    Primary Care Provider: Janis Lubin MD    Expected Discharge Date: 3/27/2023    Final Discharge Note (most recent)       Final Note - 03/27/23 1342          Final Note    Assessment Type Final Discharge Note     Anticipated Discharge Disposition Home-Health Care Svc        Post-Acute Status    Post-Acute Authorization Home Health     Home Health Status Pending Payor Review     Discharge Delays None known at this time                     Sw sent completed PHN for to pt's insurance for them to arrange HH upon d/c.     Pt to schedule PCP appointments, as needed, since pt follows with a [facility].     PHN to notify pt once HH is arranged.

## 2023-03-27 NOTE — PLAN OF CARE
Continue OT POC. Recommends HHOT.   Mod (I) for bed mobility. SBA for sit>stand with RW. CGA for stand pivot t/fs and ambulation 60ft x2 reps with RW.

## 2023-03-27 NOTE — PT/OT/SLP PROGRESS
"Occupational Therapy   Treatment    Name: Patti Bragg  MRN: [id number]  Admitting Diagnosis:  Syncope       Recommendations:     Discharge Recommendations: home health OT  Discharge Equipment Recommendations:  none  Barriers to discharge:  None    Assessment:     Patti Bragg is a 77 y.o. female with a medical diagnosis of Syncope.  She presents with the following performance deficits affecting function are weakness, impaired endurance, impaired self care skills, impaired functional mobility, gait instability, impaired balance, decreased lower extremity function, decreased safety awareness, impaired cardiopulmonary response to activity.     Rehab Prognosis:  Good; patient would benefit from acute skilled OT services to address these deficits and reach maximum level of function.       Plan:     Patient to be seen 2 x/week to address the above listed problems via self-care/home management, therapeutic activities, therapeutic exercises  Plan of Care Expires: 04/09/23  Plan of Care Reviewed with: patient, daughter, son    Subjective     Chief Complaint: None reported  Patient/Family Comments/goals: return home  Pain/Comfort:  Pain Rating 1: 0/10    "I usually wear glasses, so I can't really see too good right now."    Objective:     Communicated with: Nurse and epic chart review prior to session.  Patient found supine with peripheral IV, telemetry, PureWick upon OT entry to room.    General Precautions: Standard, fall    Orthopedic Precautions:N/A  Braces: N/A  Respiratory Status: Room air     Occupational Performance:     Bed Mobility:    Patient completed Rolling/Turning to Left with  modified independence  Patient completed Scooting/Bridging with modified independence  Patient completed Supine to Sit with modified independence     Functional Mobility/Transfers:  Patient completed Sit <> Stand Transfer with stand by assistance  with  rolling walker   Patient completed Bed <> Chair Transfer using Stand Pivot technique with " contact guard assistance with hand-held assist  Functional Mobility: Patient completed x60ft x2 reps functional mobility with CGA and RW to increase dynamic standing balance and activity tolerance needed for ADL completion.   Stand pivot t/f to EOB with CGA and RW.    Activities of Daily Living:  Upper Body Dressing: independence deng gown around back while seated  Pt reports completing grooming/hygiene ADLs earlier this morning.    [facility] 6 Click ADL: 18    Treatment & Education:  Pt reports she will be staying with her daughter upon d/c. Pt has improved in functional mobility since eval date.  Pt educated on and performed x10 reps BUE AROM therex in chair:  Shoulder flexion  Elbow flexion/ext  Digit flexion/ext  Reviewed role of OT in acute setting and benefits of participation. Educated on techniques to use to increase independence and decrease fall risk with functional transfers. Educated on importance of OOB activity and calling for A to transfer back to bed/meet needs. Encouraged completion of B UE AROM therex throughout the day to tolerance to increase functional strength and activity tolerance. Educated patient on importance of increased tolerance to upright position and direct impact on CV endurance and strength. Patient encouraged to sit up in chair for a minimum of 2 consecutive hours per day. Patient stated understanding and in agreement with POC.     Patient left up in chair with all lines intact, call button in reach, and daughter and son present    GOALS:   Multidisciplinary Problems       Occupational Therapy Goals          Problem: Occupational Therapy    Goal Priority Disciplines Outcome Interventions   Occupational Therapy Goal     OT, PT/OT Ongoing, Progressing    Description: To improve safety and increase independence during ADLs and functional tasks:    Pt will complete BSC/standard commode transfer supervision   Pt will complete LE dressing Supervision   Pt will complete g/h standing at sink  Mod I                         Time Tracking:     OT Date of Treatment: 03/27/23  OT Start Time: 1015  OT Stop Time: 1040  OT Total Time (min): 25 min    Billable Minutes:Therapeutic Activity 15  Therapeutic Exercise 10    OT/ALE: OT      Simran Rome OT     3/27/2023

## 2023-03-30 LAB
BACTERIA BLD CULT: NORMAL
BACTERIA BLD CULT: NORMAL

## 2023-05-25 ENCOUNTER — EXTERNAL HOME HEALTH (OUTPATIENT)
Dept: HOME HEALTH SERVICES | Facility: HOSPITAL | Age: 78
End: 2023-05-25
Payer: MEDICARE